# Patient Record
Sex: MALE | Race: WHITE | NOT HISPANIC OR LATINO | Employment: FULL TIME | URBAN - METROPOLITAN AREA
[De-identification: names, ages, dates, MRNs, and addresses within clinical notes are randomized per-mention and may not be internally consistent; named-entity substitution may affect disease eponyms.]

---

## 2017-04-25 ENCOUNTER — COMMUNICATION - HEALTHEAST (OUTPATIENT)
Dept: UROLOGY | Facility: CLINIC | Age: 32
End: 2017-04-25

## 2017-05-01 ENCOUNTER — COMMUNICATION - HEALTHEAST (OUTPATIENT)
Dept: FAMILY MEDICINE | Facility: CLINIC | Age: 32
End: 2017-05-01

## 2017-05-02 ENCOUNTER — OFFICE VISIT - HEALTHEAST (OUTPATIENT)
Dept: FAMILY MEDICINE | Facility: CLINIC | Age: 32
End: 2017-05-02

## 2017-05-02 DIAGNOSIS — Z01.818 PREOP EXAMINATION: ICD-10-CM

## 2017-05-02 DIAGNOSIS — N21.0 URINARY BLADDER STONE: ICD-10-CM

## 2017-05-02 ASSESSMENT — MIFFLIN-ST. JEOR: SCORE: 1638.21

## 2017-05-03 ENCOUNTER — ANESTHESIA - HEALTHEAST (OUTPATIENT)
Dept: SURGERY | Facility: HOSPITAL | Age: 32
End: 2017-05-03

## 2017-05-03 ENCOUNTER — SURGERY - HEALTHEAST (OUTPATIENT)
Dept: SURGERY | Facility: HOSPITAL | Age: 32
End: 2017-05-03

## 2017-05-09 LAB
1ST CONSTITUENT:: NORMAL
2ND CONSTITUENT:: NORMAL
SOURCE: NORMAL

## 2017-05-26 ENCOUNTER — OFFICE VISIT - HEALTHEAST (OUTPATIENT)
Dept: FAMILY MEDICINE | Facility: CLINIC | Age: 32
End: 2017-05-26

## 2017-05-26 DIAGNOSIS — E55.9 VITAMIN D DEFICIENCY: ICD-10-CM

## 2017-05-26 DIAGNOSIS — L72.0 EPIDERMAL INCLUSION CYST: ICD-10-CM

## 2017-05-26 DIAGNOSIS — Z00.00 ROUTINE GENERAL MEDICAL EXAMINATION AT A HEALTH CARE FACILITY: ICD-10-CM

## 2017-05-26 LAB
CHOLEST SERPL-MCNC: 127 MG/DL
FASTING STATUS PATIENT QL REPORTED: YES
HDLC SERPL-MCNC: 52 MG/DL
LDLC SERPL CALC-MCNC: 61 MG/DL
TRIGL SERPL-MCNC: 72 MG/DL

## 2017-05-26 ASSESSMENT — MIFFLIN-ST. JEOR: SCORE: 1631.18

## 2017-06-16 ENCOUNTER — RECORDS - HEALTHEAST (OUTPATIENT)
Dept: ADMINISTRATIVE | Facility: OTHER | Age: 32
End: 2017-06-16

## 2017-08-25 ENCOUNTER — COMMUNICATION - HEALTHEAST (OUTPATIENT)
Dept: FAMILY MEDICINE | Facility: CLINIC | Age: 32
End: 2017-08-25

## 2017-10-06 ENCOUNTER — AMBULATORY - HEALTHEAST (OUTPATIENT)
Dept: FAMILY MEDICINE | Facility: CLINIC | Age: 32
End: 2017-10-06

## 2017-10-06 DIAGNOSIS — L72.3 SEBACEOUS CYST: ICD-10-CM

## 2017-10-06 DIAGNOSIS — B07.9 WART: ICD-10-CM

## 2018-06-01 ENCOUNTER — COMMUNICATION - HEALTHEAST (OUTPATIENT)
Dept: SCHEDULING | Facility: CLINIC | Age: 33
End: 2018-06-01

## 2018-06-01 ENCOUNTER — OFFICE VISIT - HEALTHEAST (OUTPATIENT)
Dept: FAMILY MEDICINE | Facility: CLINIC | Age: 33
End: 2018-06-01

## 2018-06-01 DIAGNOSIS — E80.6 HYPERBILIRUBINEMIA: ICD-10-CM

## 2018-06-01 DIAGNOSIS — R03.0 ELEVATED BLOOD PRESSURE READING: ICD-10-CM

## 2018-06-01 DIAGNOSIS — Z00.00 ROUTINE GENERAL MEDICAL EXAMINATION AT A HEALTH CARE FACILITY: ICD-10-CM

## 2018-06-01 DIAGNOSIS — E55.9 VITAMIN D DEFICIENCY: ICD-10-CM

## 2018-06-01 DIAGNOSIS — Z13.220 SCREENING, LIPID: ICD-10-CM

## 2018-06-01 LAB
ALBUMIN SERPL-MCNC: 4.4 G/DL (ref 3.5–5)
ALP SERPL-CCNC: 79 U/L (ref 45–120)
ALT SERPL W P-5'-P-CCNC: 20 U/L (ref 0–45)
ANION GAP SERPL CALCULATED.3IONS-SCNC: 6 MMOL/L (ref 5–18)
APTT PPP: 30 SECONDS (ref 24–37)
AST SERPL W P-5'-P-CCNC: 24 U/L (ref 0–40)
BILIRUB DIRECT SERPL-MCNC: 0.3 MG/DL
BILIRUB SERPL-MCNC: 1 MG/DL (ref 0–1)
BUN SERPL-MCNC: 10 MG/DL (ref 8–22)
CALCIUM SERPL-MCNC: 9.8 MG/DL (ref 8.5–10.5)
CHLORIDE BLD-SCNC: 104 MMOL/L (ref 98–107)
CHOLEST SERPL-MCNC: 122 MG/DL
CO2 SERPL-SCNC: 30 MMOL/L (ref 22–31)
CREAT SERPL-MCNC: 0.96 MG/DL (ref 0.7–1.3)
ERYTHROCYTE [DISTWIDTH] IN BLOOD BY AUTOMATED COUNT: 11.4 % (ref 11–14.5)
FASTING STATUS PATIENT QL REPORTED: YES
GFR SERPL CREATININE-BSD FRML MDRD: >60 ML/MIN/1.73M2
GLUCOSE BLD-MCNC: 88 MG/DL (ref 70–125)
HCT VFR BLD AUTO: 44.6 % (ref 40–54)
HDLC SERPL-MCNC: 49 MG/DL
HGB BLD-MCNC: 15.4 G/DL (ref 14–18)
INR PPP: 1 (ref 0.9–1.1)
LDLC SERPL CALC-MCNC: 59 MG/DL
MCH RBC QN AUTO: 30.6 PG (ref 27–34)
MCHC RBC AUTO-ENTMCNC: 34.5 G/DL (ref 32–36)
MCV RBC AUTO: 89 FL (ref 80–100)
PLATELET # BLD AUTO: 269 THOU/UL (ref 140–440)
PMV BLD AUTO: 8.6 FL (ref 7–10)
POTASSIUM BLD-SCNC: 4.2 MMOL/L (ref 3.5–5)
PROT SERPL-MCNC: 7.3 G/DL (ref 6–8)
RBC # BLD AUTO: 5.03 MILL/UL (ref 4.4–6.2)
SODIUM SERPL-SCNC: 140 MMOL/L (ref 136–145)
TRIGL SERPL-MCNC: 70 MG/DL
WBC: 4.7 THOU/UL (ref 4–11)

## 2018-06-01 ASSESSMENT — MIFFLIN-ST. JEOR: SCORE: 1643.43

## 2018-06-04 LAB
25(OH)D3 SERPL-MCNC: 29.9 NG/ML (ref 30–80)
25(OH)D3 SERPL-MCNC: 29.9 NG/ML (ref 30–80)

## 2018-06-06 ENCOUNTER — AMBULATORY - HEALTHEAST (OUTPATIENT)
Dept: FAMILY MEDICINE | Facility: CLINIC | Age: 33
End: 2018-06-06

## 2018-10-04 ENCOUNTER — AMBULATORY - HEALTHEAST (OUTPATIENT)
Dept: NURSING | Facility: CLINIC | Age: 33
End: 2018-10-04

## 2019-04-29 ENCOUNTER — OFFICE VISIT - HEALTHEAST (OUTPATIENT)
Dept: FAMILY MEDICINE | Facility: CLINIC | Age: 34
End: 2019-04-29

## 2019-04-29 DIAGNOSIS — R07.9 ACUTE CHEST PAIN: ICD-10-CM

## 2019-04-29 DIAGNOSIS — I10 HTN (HYPERTENSION): ICD-10-CM

## 2019-04-29 LAB
ATRIAL RATE - MUSE: 64 BPM
DIASTOLIC BLOOD PRESSURE - MUSE: NORMAL MMHG
INTERPRETATION ECG - MUSE: NORMAL
P AXIS - MUSE: 60 DEGREES
PR INTERVAL - MUSE: 192 MS
QRS DURATION - MUSE: 98 MS
QT - MUSE: 404 MS
QTC - MUSE: 416 MS
R AXIS - MUSE: 61 DEGREES
SYSTOLIC BLOOD PRESSURE - MUSE: NORMAL MMHG
T AXIS - MUSE: 39 DEGREES
VENTRICULAR RATE- MUSE: 64 BPM

## 2019-04-30 LAB
ANION GAP SERPL CALCULATED.3IONS-SCNC: 11 MMOL/L (ref 5–18)
BUN SERPL-MCNC: 6 MG/DL (ref 8–22)
CALCIUM SERPL-MCNC: 10 MG/DL (ref 8.5–10.5)
CHLORIDE BLD-SCNC: 103 MMOL/L (ref 98–107)
CO2 SERPL-SCNC: 28 MMOL/L (ref 22–31)
CREAT SERPL-MCNC: 0.88 MG/DL (ref 0.7–1.3)
GFR SERPL CREATININE-BSD FRML MDRD: >60 ML/MIN/1.73M2
GLUCOSE BLD-MCNC: 88 MG/DL (ref 70–125)
POTASSIUM BLD-SCNC: 3.8 MMOL/L (ref 3.5–5)
SODIUM SERPL-SCNC: 142 MMOL/L (ref 136–145)

## 2019-05-14 ENCOUNTER — AMBULATORY - HEALTHEAST (OUTPATIENT)
Dept: NURSING | Facility: CLINIC | Age: 34
End: 2019-05-14

## 2019-05-14 DIAGNOSIS — Z01.30 BP CHECK: ICD-10-CM

## 2019-05-14 DIAGNOSIS — I10 HTN (HYPERTENSION): ICD-10-CM

## 2019-05-14 LAB
ANION GAP SERPL CALCULATED.3IONS-SCNC: 9 MMOL/L (ref 5–18)
BUN SERPL-MCNC: 8 MG/DL (ref 8–22)
CALCIUM SERPL-MCNC: 9.6 MG/DL (ref 8.5–10.5)
CHLORIDE BLD-SCNC: 104 MMOL/L (ref 98–107)
CO2 SERPL-SCNC: 26 MMOL/L (ref 22–31)
CREAT SERPL-MCNC: 0.94 MG/DL (ref 0.7–1.3)
GFR SERPL CREATININE-BSD FRML MDRD: >60 ML/MIN/1.73M2
GLUCOSE BLD-MCNC: 96 MG/DL (ref 70–125)
POTASSIUM BLD-SCNC: 3.9 MMOL/L (ref 3.5–5)
SODIUM SERPL-SCNC: 139 MMOL/L (ref 136–145)

## 2019-06-04 ENCOUNTER — OFFICE VISIT - HEALTHEAST (OUTPATIENT)
Dept: FAMILY MEDICINE | Facility: CLINIC | Age: 34
End: 2019-06-04

## 2019-06-04 DIAGNOSIS — E55.9 VITAMIN D DEFICIENCY: ICD-10-CM

## 2019-06-04 DIAGNOSIS — N20.0 CALCULUS OF KIDNEY: ICD-10-CM

## 2019-06-04 DIAGNOSIS — Z00.00 ROUTINE GENERAL MEDICAL EXAMINATION AT A HEALTH CARE FACILITY: ICD-10-CM

## 2019-06-04 DIAGNOSIS — I10 HTN (HYPERTENSION): ICD-10-CM

## 2019-06-04 ASSESSMENT — MIFFLIN-ST. JEOR: SCORE: 1646.83

## 2019-06-19 ENCOUNTER — COMMUNICATION - HEALTHEAST (OUTPATIENT)
Dept: SCHEDULING | Facility: CLINIC | Age: 34
End: 2019-06-19

## 2019-06-21 ENCOUNTER — OFFICE VISIT - HEALTHEAST (OUTPATIENT)
Dept: FAMILY MEDICINE | Facility: CLINIC | Age: 34
End: 2019-06-21

## 2019-06-21 ENCOUNTER — COMMUNICATION - HEALTHEAST (OUTPATIENT)
Dept: FAMILY MEDICINE | Facility: CLINIC | Age: 34
End: 2019-06-21

## 2019-06-21 DIAGNOSIS — R55 NEAR SYNCOPE: ICD-10-CM

## 2019-06-21 DIAGNOSIS — S06.0X9D CONCUSSION WITH LOSS OF CONSCIOUSNESS, SUBSEQUENT ENCOUNTER: ICD-10-CM

## 2019-06-21 DIAGNOSIS — I10 ESSENTIAL HYPERTENSION: ICD-10-CM

## 2019-06-21 DIAGNOSIS — I10 HTN (HYPERTENSION): ICD-10-CM

## 2019-06-21 RX ORDER — ACETAMINOPHEN 500 MG
500 TABLET ORAL EVERY 6 HOURS PRN
Status: SHIPPED | COMMUNITY
Start: 2019-06-21

## 2019-09-17 ENCOUNTER — AMBULATORY - HEALTHEAST (OUTPATIENT)
Dept: NURSING | Facility: CLINIC | Age: 34
End: 2019-09-17

## 2020-02-24 ENCOUNTER — OFFICE VISIT - HEALTHEAST (OUTPATIENT)
Dept: FAMILY MEDICINE | Facility: CLINIC | Age: 35
End: 2020-02-24

## 2020-02-24 DIAGNOSIS — I10 ESSENTIAL HYPERTENSION: ICD-10-CM

## 2020-02-24 DIAGNOSIS — F41.0 PANIC ATTACK: ICD-10-CM

## 2020-02-25 LAB
ANION GAP SERPL CALCULATED.3IONS-SCNC: 10 MMOL/L (ref 5–18)
BUN SERPL-MCNC: 6 MG/DL (ref 8–22)
CALCIUM SERPL-MCNC: 9.9 MG/DL (ref 8.5–10.5)
CHLORIDE BLD-SCNC: 102 MMOL/L (ref 98–107)
CO2 SERPL-SCNC: 29 MMOL/L (ref 22–31)
CREAT SERPL-MCNC: 0.95 MG/DL (ref 0.7–1.3)
GFR SERPL CREATININE-BSD FRML MDRD: >60 ML/MIN/1.73M2
GLUCOSE BLD-MCNC: 68 MG/DL (ref 70–125)
POTASSIUM BLD-SCNC: 3.8 MMOL/L (ref 3.5–5)
SODIUM SERPL-SCNC: 141 MMOL/L (ref 136–145)
T4 FREE SERPL-MCNC: 1 NG/DL (ref 0.7–1.8)
TSH SERPL DL<=0.005 MIU/L-ACNC: 0.38 UIU/ML (ref 0.3–5)

## 2020-03-02 ENCOUNTER — OFFICE VISIT - HEALTHEAST (OUTPATIENT)
Dept: FAMILY MEDICINE | Facility: CLINIC | Age: 35
End: 2020-03-02

## 2020-03-02 DIAGNOSIS — F41.0 PANIC ATTACK: ICD-10-CM

## 2020-03-02 DIAGNOSIS — M54.50 ACUTE MIDLINE LOW BACK PAIN WITHOUT SCIATICA: ICD-10-CM

## 2020-03-02 DIAGNOSIS — M62.08 DIASTASIS RECTI: ICD-10-CM

## 2020-03-02 ASSESSMENT — ANXIETY QUESTIONNAIRES
IF YOU CHECKED OFF ANY PROBLEMS ON THIS QUESTIONNAIRE, HOW DIFFICULT HAVE THESE PROBLEMS MADE IT FOR YOU TO DO YOUR WORK, TAKE CARE OF THINGS AT HOME, OR GET ALONG WITH OTHER PEOPLE: SOMEWHAT DIFFICULT
3. WORRYING TOO MUCH ABOUT DIFFERENT THINGS: SEVERAL DAYS
2. NOT BEING ABLE TO STOP OR CONTROL WORRYING: SEVERAL DAYS
5. BEING SO RESTLESS THAT IT IS HARD TO SIT STILL: NOT AT ALL
GAD7 TOTAL SCORE: 7
1. FEELING NERVOUS, ANXIOUS, OR ON EDGE: SEVERAL DAYS
6. BECOMING EASILY ANNOYED OR IRRITABLE: MORE THAN HALF THE DAYS
4. TROUBLE RELAXING: MORE THAN HALF THE DAYS
7. FEELING AFRAID AS IF SOMETHING AWFUL MIGHT HAPPEN: NOT AT ALL

## 2020-06-29 ENCOUNTER — COMMUNICATION - HEALTHEAST (OUTPATIENT)
Dept: FAMILY MEDICINE | Facility: CLINIC | Age: 35
End: 2020-06-29

## 2020-06-29 DIAGNOSIS — I10 HTN (HYPERTENSION): ICD-10-CM

## 2020-08-11 ENCOUNTER — OFFICE VISIT - HEALTHEAST (OUTPATIENT)
Dept: FAMILY MEDICINE | Facility: CLINIC | Age: 35
End: 2020-08-11

## 2020-08-11 DIAGNOSIS — Z00.00 ROUTINE GENERAL MEDICAL EXAMINATION AT A HEALTH CARE FACILITY: ICD-10-CM

## 2020-08-11 DIAGNOSIS — Z79.899 CONTROLLED SUBSTANCE AGREEMENT SIGNED: ICD-10-CM

## 2020-08-11 DIAGNOSIS — F90.9 ATTENTION DEFICIT HYPERACTIVITY DISORDER (ADHD), UNSPECIFIED ADHD TYPE: ICD-10-CM

## 2020-08-11 DIAGNOSIS — E55.9 VITAMIN D DEFICIENCY: ICD-10-CM

## 2020-08-11 DIAGNOSIS — I10 ESSENTIAL HYPERTENSION: ICD-10-CM

## 2020-08-11 DIAGNOSIS — Z13.220 LIPID SCREENING: ICD-10-CM

## 2020-08-11 LAB
ALBUMIN SERPL-MCNC: 4.7 G/DL (ref 3.5–5)
ALP SERPL-CCNC: 71 U/L (ref 45–120)
ALT SERPL W P-5'-P-CCNC: 18 U/L (ref 0–45)
ANION GAP SERPL CALCULATED.3IONS-SCNC: 11 MMOL/L (ref 5–18)
AST SERPL W P-5'-P-CCNC: 19 U/L (ref 0–40)
BILIRUB SERPL-MCNC: 1.3 MG/DL (ref 0–1)
BUN SERPL-MCNC: 8 MG/DL (ref 8–22)
CALCIUM SERPL-MCNC: 9.7 MG/DL (ref 8.5–10.5)
CHLORIDE BLD-SCNC: 104 MMOL/L (ref 98–107)
CHOLEST SERPL-MCNC: 124 MG/DL
CO2 SERPL-SCNC: 25 MMOL/L (ref 22–31)
CREAT SERPL-MCNC: 0.87 MG/DL (ref 0.7–1.3)
FASTING STATUS PATIENT QL REPORTED: YES
GFR SERPL CREATININE-BSD FRML MDRD: >60 ML/MIN/1.73M2
GLUCOSE BLD-MCNC: 91 MG/DL (ref 70–125)
HDLC SERPL-MCNC: 53 MG/DL
LDLC SERPL CALC-MCNC: 59 MG/DL
POTASSIUM BLD-SCNC: 3.8 MMOL/L (ref 3.5–5)
PROT SERPL-MCNC: 7.5 G/DL (ref 6–8)
SODIUM SERPL-SCNC: 140 MMOL/L (ref 136–145)
TRIGL SERPL-MCNC: 58 MG/DL

## 2020-08-11 RX ORDER — LISINOPRIL 10 MG/1
10 TABLET ORAL DAILY
Qty: 90 TABLET | Refills: 3 | Status: SHIPPED | OUTPATIENT
Start: 2020-08-11 | End: 2021-08-20

## 2020-08-11 ASSESSMENT — ANXIETY QUESTIONNAIRES
2. NOT BEING ABLE TO STOP OR CONTROL WORRYING: MORE THAN HALF THE DAYS
3. WORRYING TOO MUCH ABOUT DIFFERENT THINGS: NEARLY EVERY DAY
IF YOU CHECKED OFF ANY PROBLEMS ON THIS QUESTIONNAIRE, HOW DIFFICULT HAVE THESE PROBLEMS MADE IT FOR YOU TO DO YOUR WORK, TAKE CARE OF THINGS AT HOME, OR GET ALONG WITH OTHER PEOPLE: VERY DIFFICULT
4. TROUBLE RELAXING: NEARLY EVERY DAY
GAD7 TOTAL SCORE: 16
6. BECOMING EASILY ANNOYED OR IRRITABLE: MORE THAN HALF THE DAYS
5. BEING SO RESTLESS THAT IT IS HARD TO SIT STILL: SEVERAL DAYS
1. FEELING NERVOUS, ANXIOUS, OR ON EDGE: MORE THAN HALF THE DAYS
7. FEELING AFRAID AS IF SOMETHING AWFUL MIGHT HAPPEN: NEARLY EVERY DAY

## 2020-08-11 ASSESSMENT — MIFFLIN-ST. JEOR: SCORE: 1672.01

## 2020-08-11 ASSESSMENT — PATIENT HEALTH QUESTIONNAIRE - PHQ9: SUM OF ALL RESPONSES TO PHQ QUESTIONS 1-9: 8

## 2020-08-12 LAB
25(OH)D3 SERPL-MCNC: 41.8 NG/ML (ref 30–80)
25(OH)D3 SERPL-MCNC: 41.8 NG/ML (ref 30–80)

## 2020-09-10 ENCOUNTER — COMMUNICATION - HEALTHEAST (OUTPATIENT)
Dept: FAMILY MEDICINE | Facility: CLINIC | Age: 35
End: 2020-09-10

## 2020-09-10 DIAGNOSIS — F90.9 ATTENTION DEFICIT HYPERACTIVITY DISORDER (ADHD), UNSPECIFIED ADHD TYPE: ICD-10-CM

## 2020-10-05 ENCOUNTER — OFFICE VISIT - HEALTHEAST (OUTPATIENT)
Dept: FAMILY MEDICINE | Facility: CLINIC | Age: 35
End: 2020-10-05

## 2020-10-05 DIAGNOSIS — F90.9 ATTENTION DEFICIT HYPERACTIVITY DISORDER (ADHD), UNSPECIFIED ADHD TYPE: ICD-10-CM

## 2020-10-06 ENCOUNTER — AMBULATORY - HEALTHEAST (OUTPATIENT)
Dept: OTHER | Facility: CLINIC | Age: 35
End: 2020-10-06

## 2020-10-13 ENCOUNTER — COMMUNICATION - HEALTHEAST (OUTPATIENT)
Dept: FAMILY MEDICINE | Facility: CLINIC | Age: 35
End: 2020-10-13

## 2020-11-11 ENCOUNTER — COMMUNICATION - HEALTHEAST (OUTPATIENT)
Dept: FAMILY MEDICINE | Facility: CLINIC | Age: 35
End: 2020-11-11

## 2020-11-12 ENCOUNTER — AMBULATORY - HEALTHEAST (OUTPATIENT)
Dept: FAMILY MEDICINE | Facility: CLINIC | Age: 35
End: 2020-11-12

## 2020-11-12 DIAGNOSIS — F90.9 ATTENTION DEFICIT HYPERACTIVITY DISORDER (ADHD), UNSPECIFIED ADHD TYPE: ICD-10-CM

## 2020-12-11 ENCOUNTER — COMMUNICATION - HEALTHEAST (OUTPATIENT)
Dept: FAMILY MEDICINE | Facility: CLINIC | Age: 35
End: 2020-12-11

## 2020-12-11 DIAGNOSIS — F90.9 ATTENTION DEFICIT HYPERACTIVITY DISORDER (ADHD), UNSPECIFIED ADHD TYPE: ICD-10-CM

## 2021-01-12 ENCOUNTER — AMBULATORY - HEALTHEAST (OUTPATIENT)
Dept: FAMILY MEDICINE | Facility: CLINIC | Age: 36
End: 2021-01-12

## 2021-01-12 DIAGNOSIS — F90.9 ATTENTION DEFICIT HYPERACTIVITY DISORDER (ADHD), UNSPECIFIED ADHD TYPE: ICD-10-CM

## 2021-02-12 ENCOUNTER — COMMUNICATION - HEALTHEAST (OUTPATIENT)
Dept: FAMILY MEDICINE | Facility: CLINIC | Age: 36
End: 2021-02-12

## 2021-02-12 DIAGNOSIS — F90.9 ATTENTION DEFICIT HYPERACTIVITY DISORDER (ADHD), UNSPECIFIED ADHD TYPE: ICD-10-CM

## 2021-03-01 ENCOUNTER — RECORDS - HEALTHEAST (OUTPATIENT)
Dept: ADMINISTRATIVE | Facility: OTHER | Age: 36
End: 2021-03-01

## 2021-03-02 ENCOUNTER — COMMUNICATION - HEALTHEAST (OUTPATIENT)
Dept: FAMILY MEDICINE | Facility: CLINIC | Age: 36
End: 2021-03-02

## 2021-03-02 DIAGNOSIS — S92.404S CLOSED NONDISPLACED FRACTURE OF PHALANX OF RIGHT GREAT TOE, UNSPECIFIED PHALANX, SEQUELA: ICD-10-CM

## 2021-03-02 RX ORDER — IBUPROFEN 800 MG/1
800 TABLET, FILM COATED ORAL EVERY 8 HOURS PRN
Qty: 60 TABLET | Refills: 0 | Status: SHIPPED | OUTPATIENT
Start: 2021-03-02 | End: 2021-11-14

## 2021-03-08 ENCOUNTER — COMMUNICATION - HEALTHEAST (OUTPATIENT)
Dept: FAMILY MEDICINE | Facility: CLINIC | Age: 36
End: 2021-03-08

## 2021-03-08 ENCOUNTER — OFFICE VISIT - HEALTHEAST (OUTPATIENT)
Dept: FAMILY MEDICINE | Facility: CLINIC | Age: 36
End: 2021-03-08

## 2021-03-08 DIAGNOSIS — S92.404S CLOSED NONDISPLACED FRACTURE OF PHALANX OF RIGHT GREAT TOE, UNSPECIFIED PHALANX, SEQUELA: ICD-10-CM

## 2021-03-08 DIAGNOSIS — F43.10 PTSD (POST-TRAUMATIC STRESS DISORDER): ICD-10-CM

## 2021-03-08 DIAGNOSIS — F90.9 ATTENTION DEFICIT HYPERACTIVITY DISORDER (ADHD), UNSPECIFIED ADHD TYPE: ICD-10-CM

## 2021-03-16 ENCOUNTER — COMMUNICATION - HEALTHEAST (OUTPATIENT)
Dept: FAMILY MEDICINE | Facility: CLINIC | Age: 36
End: 2021-03-16

## 2021-03-22 ENCOUNTER — RECORDS - HEALTHEAST (OUTPATIENT)
Dept: ADMINISTRATIVE | Facility: OTHER | Age: 36
End: 2021-03-22

## 2021-03-29 ENCOUNTER — OFFICE VISIT - HEALTHEAST (OUTPATIENT)
Dept: FAMILY MEDICINE | Facility: CLINIC | Age: 36
End: 2021-03-29

## 2021-03-29 DIAGNOSIS — F43.10 PTSD (POST-TRAUMATIC STRESS DISORDER): ICD-10-CM

## 2021-03-29 ASSESSMENT — ANXIETY QUESTIONNAIRES
6. BECOMING EASILY ANNOYED OR IRRITABLE: SEVERAL DAYS
3. WORRYING TOO MUCH ABOUT DIFFERENT THINGS: MORE THAN HALF THE DAYS
GAD7 TOTAL SCORE: 10
7. FEELING AFRAID AS IF SOMETHING AWFUL MIGHT HAPPEN: MORE THAN HALF THE DAYS
4. TROUBLE RELAXING: MORE THAN HALF THE DAYS
IF YOU CHECKED OFF ANY PROBLEMS ON THIS QUESTIONNAIRE, HOW DIFFICULT HAVE THESE PROBLEMS MADE IT FOR YOU TO DO YOUR WORK, TAKE CARE OF THINGS AT HOME, OR GET ALONG WITH OTHER PEOPLE: SOMEWHAT DIFFICULT
2. NOT BEING ABLE TO STOP OR CONTROL WORRYING: SEVERAL DAYS
1. FEELING NERVOUS, ANXIOUS, OR ON EDGE: SEVERAL DAYS
5. BEING SO RESTLESS THAT IT IS HARD TO SIT STILL: SEVERAL DAYS

## 2021-03-29 ASSESSMENT — PATIENT HEALTH QUESTIONNAIRE - PHQ9: SUM OF ALL RESPONSES TO PHQ QUESTIONS 1-9: 4

## 2021-04-12 ENCOUNTER — RECORDS - HEALTHEAST (OUTPATIENT)
Dept: ADMINISTRATIVE | Facility: OTHER | Age: 36
End: 2021-04-12

## 2021-04-13 ENCOUNTER — COMMUNICATION - HEALTHEAST (OUTPATIENT)
Dept: FAMILY MEDICINE | Facility: CLINIC | Age: 36
End: 2021-04-13

## 2021-04-13 DIAGNOSIS — F90.9 ATTENTION DEFICIT HYPERACTIVITY DISORDER (ADHD), UNSPECIFIED ADHD TYPE: ICD-10-CM

## 2021-04-19 ENCOUNTER — OFFICE VISIT - HEALTHEAST (OUTPATIENT)
Dept: FAMILY MEDICINE | Facility: CLINIC | Age: 36
End: 2021-04-19

## 2021-04-19 DIAGNOSIS — F90.9 ATTENTION DEFICIT HYPERACTIVITY DISORDER (ADHD), UNSPECIFIED ADHD TYPE: ICD-10-CM

## 2021-04-19 DIAGNOSIS — F43.10 PTSD (POST-TRAUMATIC STRESS DISORDER): ICD-10-CM

## 2021-05-19 ENCOUNTER — RECORDS - HEALTHEAST (OUTPATIENT)
Dept: ADMINISTRATIVE | Facility: OTHER | Age: 36
End: 2021-05-19

## 2021-05-19 DIAGNOSIS — F90.9 ATTENTION DEFICIT HYPERACTIVITY DISORDER (ADHD), UNSPECIFIED ADHD TYPE: ICD-10-CM

## 2021-05-19 RX ORDER — DEXTROAMPHETAMINE SACCHARATE, AMPHETAMINE ASPARTATE MONOHYDRATE, DEXTROAMPHETAMINE SULFATE AND AMPHETAMINE SULFATE 7.5; 7.5; 7.5; 7.5 MG/1; MG/1; MG/1; MG/1
30 CAPSULE, EXTENDED RELEASE ORAL DAILY
Qty: 30 CAPSULE | Refills: 0 | Status: SHIPPED | OUTPATIENT
Start: 2021-05-19 | End: 2021-07-21

## 2021-05-24 ENCOUNTER — OFFICE VISIT - HEALTHEAST (OUTPATIENT)
Dept: FAMILY MEDICINE | Facility: CLINIC | Age: 36
End: 2021-05-24

## 2021-05-24 DIAGNOSIS — R10.9 CHRONIC ABDOMINAL PAIN: ICD-10-CM

## 2021-05-24 DIAGNOSIS — F43.10 PTSD (POST-TRAUMATIC STRESS DISORDER): ICD-10-CM

## 2021-05-24 DIAGNOSIS — M25.511 ACUTE PAIN OF RIGHT SHOULDER: ICD-10-CM

## 2021-05-24 DIAGNOSIS — G89.29 CHRONIC ABDOMINAL PAIN: ICD-10-CM

## 2021-05-24 RX ORDER — VENLAFAXINE HYDROCHLORIDE 150 MG/1
150 CAPSULE, EXTENDED RELEASE ORAL DAILY
Qty: 30 CAPSULE | Refills: 2 | Status: SHIPPED
Start: 2021-05-24 | End: 2021-07-22

## 2021-05-24 ASSESSMENT — ANXIETY QUESTIONNAIRES
6. BECOMING EASILY ANNOYED OR IRRITABLE: SEVERAL DAYS
5. BEING SO RESTLESS THAT IT IS HARD TO SIT STILL: NOT AT ALL
GAD7 TOTAL SCORE: 7
7. FEELING AFRAID AS IF SOMETHING AWFUL MIGHT HAPPEN: SEVERAL DAYS
1. FEELING NERVOUS, ANXIOUS, OR ON EDGE: SEVERAL DAYS
2. NOT BEING ABLE TO STOP OR CONTROL WORRYING: SEVERAL DAYS
3. WORRYING TOO MUCH ABOUT DIFFERENT THINGS: SEVERAL DAYS
IF YOU CHECKED OFF ANY PROBLEMS ON THIS QUESTIONNAIRE, HOW DIFFICULT HAVE THESE PROBLEMS MADE IT FOR YOU TO DO YOUR WORK, TAKE CARE OF THINGS AT HOME, OR GET ALONG WITH OTHER PEOPLE: SOMEWHAT DIFFICULT
4. TROUBLE RELAXING: MORE THAN HALF THE DAYS

## 2021-05-24 ASSESSMENT — PATIENT HEALTH QUESTIONNAIRE - PHQ9: SUM OF ALL RESPONSES TO PHQ QUESTIONS 1-9: 7

## 2021-05-24 ASSESSMENT — MIFFLIN-ST. JEOR: SCORE: 1644.23

## 2021-05-26 ASSESSMENT — PATIENT HEALTH QUESTIONNAIRE - PHQ9: SUM OF ALL RESPONSES TO PHQ QUESTIONS 1-9: 8

## 2021-05-27 ASSESSMENT — PATIENT HEALTH QUESTIONNAIRE - PHQ9: SUM OF ALL RESPONSES TO PHQ QUESTIONS 1-9: 4

## 2021-05-28 ASSESSMENT — ANXIETY QUESTIONNAIRES
GAD7 TOTAL SCORE: 16
GAD7 TOTAL SCORE: 10
GAD7 TOTAL SCORE: 7

## 2021-05-28 NOTE — PROGRESS NOTES
ASSESSMENT/PLAN:       1. HTN (hypertension)  -Blood pressure remains elevated, elevated this morning as well.  He now has several elevated blood pressures on record, with this and his strong family history of high blood pressure he is agreeable to starting a low-dose of lisinopril.  He will update his basic metabolic panel today and again in 2 weeks.  He will follow-up here in approximately 1 month for a physical and we can check things at that time.  In the meantime he will watch for any signs of orthostatic hypotension including dizziness upon standing.  If he starts noting that he will let me know.  -We discussed that his headaches are most likely related to his high blood pressure, and if they are not improving he will let me know.  Additionally the chest pain is fairly intermittent and not related to activity but if it persists or starts worsening he will let me know in which case I will order stress test.  He is very low risk however given his age.  - Electrocardiogram Perform and Read  - Basic Metabolic Panel  - lisinopril (PRINIVIL,ZESTRIL) 10 MG tablet; Take 1 tablet (10 mg total) by mouth daily.  Dispense: 30 tablet; Refill: 1    2. Acute chest pain  -Intermittent in nature, not typically associated with movement, will continue to monitor for now.  - Electrocardiogram Perform and Read    Starting medication, will f/u with nurses in a few weeks for BP check and updated labs. Will see me in about one month for a physical.     Return for Next scheduled follow up.      Lilly Catalan MD      PROGRESS NOTE   4/29/2019    SUBJECTIVE:  Aurelio Ames is a 33 y.o. male  who presents for high blood presure.     BP was elevated at physical last year, and here this morning with son again. Comes in for a visit. Has had some intermittnet chest pressure at times, not always related to exercise, can happen when just sitting down. He has had headaches as well, they have been going on at least one month. He  has not been more short of breath than usual.     Does drink energy drinks, 1/day, does vape, roughly 60 mg per 3 weeks. Is stressed all the time.   Chief Complaint   Patient presents with     Hypertension     Patient is here today to follow up on his blood pressure. He has been having an increase in headaches and has pain on the left side of his chest while at work or when keeping busy at home.          Patient Active Problem List   Diagnosis     Microscopic Hematuria     Nephrolithiasis     Vitamin D Deficiency     Elevated blood pressure reading     History of colostomy       Current Outpatient Medications   Medication Sig Dispense Refill     CRANBERRY FRUIT CONCENTRATE (AZO CRANBERRY ORAL) Take by mouth daily.       docosahexanoic acid/epa (FISH OIL ORAL) Take by mouth daily.       ergocalciferol, vitamin D2, (VITAMIN D2 ORAL) Take by mouth 2 (two) times a day.       MAGNESIUM ORAL Take by mouth daily.       MULTIVIT-MINERALS/FA/LYCOPENE (MEN'S DAILY ORAL) Take by mouth.       lisinopril (PRINIVIL,ZESTRIL) 10 MG tablet Take 1 tablet (10 mg total) by mouth daily. 30 tablet 1     No current facility-administered medications for this visit.        Social History     Tobacco Use   Smoking Status Former Smoker     Packs/day: 1.50   Smokeless Tobacco Current User   Tobacco Comment    Uses vape pen           OBJECTIVE:        Recent Results (from the past 240 hour(s))   Electrocardiogram Perform and Read   Result Value Ref Range    SYSTOLIC BLOOD PRESSURE  mmHg    DIASTOLIC BLOOD PRESSURE  mmHg    VENTRICULAR RATE 64 BPM    ATRIAL RATE 64 BPM    P-R INTERVAL 192 ms    QRS DURATION 98 ms    Q-T INTERVAL 404 ms    QTC CALCULATION (BEZET) 416 ms    P Axis 60 degrees    R AXIS 61 degrees    T AXIS 39 degrees    MUSE DIAGNOSIS       Normal sinus rhythm  Minimal voltage criteria for LVH, may be normal variant  Borderline ECG  No previous ECGs available  Confirmed by LISETH DIMAS MD LOC:[facility] ([id number]) on 4/29/2019 3:56:55 PM          Vitals:    04/29/19 1534 04/29/19 1602   BP: (!) 142/100 (!) 144/100   Patient Site: Left Arm    Patient Position: Sitting    Cuff Size: Adult Regular    Pulse: 70    SpO2: 98%    Weight: 164 lb (74.4 kg)      Weight: 164 lb (74.4 kg)          Physical Exam:  GENERAL APPEARANCE: A&A, NAD, well hydrated, well nourished  SKIN:  Normal skin turgor, no lesions/rashes   HEENT: moist mucous membranes, no rhinorrhea  NECK: Normal  CV: RRR, no M/G/R   LUNGS: CTAB  ABDOMEN: S&NT, no masses   EXTREMITY: no edema   NEURO: no gross deficits

## 2021-05-28 NOTE — PROGRESS NOTES
I met with Aurelio Ames at the request of Dr. Catalan to recheck his blood pressure.  Blood pressure medications on the MAR were reviewed with patient.    Patient has taken all medications as per usual regimen: Yes  Patient reports tolerating them without any issues or concerns: Yes    Vitals:    05/14/19 0858   BP: 108/82       Blood pressure was taken, previous encounter was reviewed, recorded blood pressure below 140/90.  Patient was discharged and the note will be sent to the provider for final review.

## 2021-05-28 NOTE — PATIENT INSTRUCTIONS - HE
Starting medication, will f/u with nurses in a few weeks for BP check and updated labs. Will see me in about one month for a physical.

## 2021-05-29 ENCOUNTER — RECORDS - HEALTHEAST (OUTPATIENT)
Dept: ADMINISTRATIVE | Facility: CLINIC | Age: 36
End: 2021-05-29
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
## 2021-05-29 NOTE — PROGRESS NOTES
ASSESSMENT/PLAN:       1. Concussion with loss of consciousness, subsequent encounter  Okay for the patient to return to work tomorrow with our restrictions  Stay well-hydrated  Try to limit screen time and do not do strenuous physical activity for 2 or 3 more days    2. Essential hypertension  Continue on lisinopril 10 mg daily  Blood pressure seems to be well controlled and no other side effects    3. Near syncope  No further symptoms and would anticipate that he will do fine going back to work tomorrow.        25 minutes spent total with the patient with greater than 50% of that time being spent in counseling in regard to the above problems    Igor Holloway MD      PROGRESS NOTE   6/21/2019    SUBJECTIVE:  Aurelio Ames is a 33 y.o. male  who presents for   Chief Complaint   Patient presents with     Follow-up     [facility] ER 6/19, hit head. Yesterday felt dizzy, nausous and had headache. Today he feels back to normal.     1. Concussion with loss of consciousness, subsequent encounter  In June 18 the patient was at the playground with his child and child 3 years old and he was concerned that he is going to jump off the playground equipment the patient was up on the equipment raised up quickly and hit the top of his head on the playground equipment.  He saw stars and briefly lost consciousness.  He felt fine after that although then the next day he had some nausea headache and while he was at work he felt very flushed sweaty and as he was standing he felt like he was going to pass out.  His vision narrowed and he was able to sit down and call for help.  He also had some associated shortness of breath and chest pain and thus went to the emergency room by ambulance. he does not recall that he was told his blood pressure was low in the readings I see from the urgency room visit looked satisfactory.  He had testing done in the emergency room that included a CT scan of the head which was negative and his other  blood work looked fine.  He was given some IV fluids and dismissed..  The next day he still felt foggy in his thinking with headache and he stayed home from work and rested.  This morning when he woke up he feels much better with no further headache is thinking seems clear he does not have any nausea.    2. Essential hypertension  Note that the patient in the last month was started on lisinopril and in the emergency room his creatinine and electrolytes were satisfactory.  He is continued on lisinopril 10 mg daily    3. Near syncope  The patient has not had any further syncopal symptoms and has been drinking good amounts of fluid.  Of note the CT scan did show some fluid in his left maxillary sinus but he has no symptoms of sinusitis  Patient Active Problem List   Diagnosis     Microscopic Hematuria     Nephrolithiasis     Vitamin D Deficiency     Elevated blood pressure reading     History of colostomy     HTN (hypertension)       Current Outpatient Medications   Medication Sig Dispense Refill     acetaminophen (TYLENOL) 500 MG tablet Take 500 mg by mouth every 6 (six) hours as needed for pain.       CRANBERRY FRUIT CONCENTRATE (AZO CRANBERRY ORAL) Take by mouth daily.       docosahexanoic acid/epa (FISH OIL ORAL) Take by mouth daily.       ergocalciferol, vitamin D2, (VITAMIN D2 ORAL) Take by mouth 2 (two) times a day.       lisinopril (PRINIVIL,ZESTRIL) 10 MG tablet Take 1 tablet (10 mg total) by mouth daily. 90 tablet 3     MAGNESIUM ORAL Take by mouth daily.       MULTIVIT-MINERALS/FA/LYCOPENE (MEN'S DAILY ORAL) Take by mouth.       No current facility-administered medications for this visit.        Social History     Tobacco Use   Smoking Status Former Smoker     Packs/day: 1.50   Smokeless Tobacco Current User   Tobacco Comment    Uses vape pen           OBJECTIVE:        Recent Results (from the past 240 hour(s))   HM2(CBC w/o Differential)   Result Value Ref Range    WBC 5.3 4.0 - 11.0 thou/uL    RBC 4.91  4.40 - 6.20 mill/uL    Hemoglobin 14.9 14.0 - 18.0 g/dL    Hematocrit 40.9 40.0 - 54.0 %    MCV 83 80 - 100 fL    MCH 30.3 27.0 - 34.0 pg    MCHC 36.4 (H) 32.0 - 36.0 g/dL    RDW 12.0 11.0 - 14.5 %    Platelets 284 140 - 440 thou/uL    MPV 9.8 8.5 - 12.5 fL   Basic Metabolic Panel   Result Value Ref Range    Sodium 138 136 - 145 mmol/L    Potassium 3.7 3.5 - 5.0 mmol/L    Chloride 104 98 - 107 mmol/L    CO2 25 22 - 31 mmol/L    Anion Gap, Calculation 9 5 - 18 mmol/L    Glucose 108 70 - 125 mg/dL    Calcium 9.7 8.5 - 10.5 mg/dL    BUN 10 8 - 22 mg/dL    Creatinine 0.96 0.70 - 1.30 mg/dL    GFR MDRD Af Amer >60 >60 mL/min/1.73m2    GFR MDRD Non Af Amer >60 >60 mL/min/1.73m2       Vitals:    06/21/19 1048   BP: 105/56   Pulse: 80   SpO2: 97%   Weight: 155 lb (70.3 kg)     Weight: 155 lb (70.3 kg)          Physical Exam:  GENERAL APPEARANCE: 33-year-old male, NAD, well hydrated, well nourished  SKIN:  Normal skin turgor, no lesions/rashes   HEENT: moist mucous membranes, no rhinorrhea  NECK: Normal without adenopathy or masses, supple with good range of motion.  Head is tender over the top of his head where he bumped it and there is a small raised area there but no break in the skin  CV: RRR, no M/G/R   LUNGS: CTAB  ABDOMEN: S&NT, no masses or enlarged organs   EXTREMITY: no edema and full ROM of all joints  NEURO: no focal findings, no focal deficits and his gait is normal including balance and negative Romberg

## 2021-05-29 NOTE — TELEPHONE ENCOUNTER
Attempted to call and schedule pt but VM is full.  If pt calls back please help in setting up an appt for evaluation

## 2021-05-29 NOTE — TELEPHONE ENCOUNTER
New Appointment Needed  What is the reason for the visit: pt has mild concussion [facility] ED     Inpatient/ED Follow Up Appt Request  At what hospital or facility were you seen?: [facility]  What is the reason you were seen?:  Mild concussion  What date were you admitted?: date: [id number]  What date were you discharged?: date: tyra Catalan  What was the recommended timeframe for your follow up appointment?: 061919  Provider Preference: by friday [id number]  How soon do you need to be seen?: By Friday [id number]  Waitlist offered?: No  Okay to leave a detailed message:  Yes

## 2021-05-29 NOTE — TELEPHONE ENCOUNTER
Refill Approved    Rx renewed per Medication Renewal Policy. Medication was last renewed on 6/4/2019 with 3 refills, #90 each..   Noted request for #30 to dispense.   Last office visit: 6/4/2019 with PCP Dr CULLEN Matthew, [facility] Triage/Med Refill 6/23/2019     Requested Prescriptions   Pending Prescriptions Disp Refills     lisinopril (PRINIVIL,ZESTRIL) 10 MG tablet [Pharmacy Med Name: LISINOPRIL 10MG TABLETS] 30 tablet 0     Sig: TAKE 1 TABLET(10 MG) BY MOUTH DAILY       [facility] Inhib[facility] Refill Protocol Passed - 6/21/2019  3:11 AM        Passed - PCP or prescribing provider visit in past 12 months       Last office visit with prescriber/PCP: 4/29/2019 Lilly Catalan MD OR same dept: 4/29/2019 Lilly Catalan MD OR same specialty: 4/29/2019 Lilly Catalan MD  Last physical: 6/4/2019 Last MTM visit: Visit date not found   Next visit within 3 mo: Visit date not found  Next physical within 3 mo: Visit date not found  Prescriber OR PCP: Lilly Catalan MD  Last diagnosis associated with med order: 1. HTN (hypertension)  - lisinopril (PRINIVIL,ZESTRIL) 10 MG tablet [Pharmacy Med Name: LISINOPRIL 10MG TABLETS]; TAKE 1 TABLET(10 MG) BY MOUTH DAILY  Dispense: 30 tablet; Refill: 0    If protocol passes may refill for 12 months if within 3 months of last provider visit (or a total of 15 months).             Passed - Serum Potassium in past 12 months     Lab Results   Component Value Date    Potassium 3.7 06/19/2019             Passed - Blood pressure filed in past 12 months     BP Readings from Last 1 Encounters:   06/21/19 105/56             Passed - Serum Creatinine in past 12 months     Creatinine   Date Value Ref Range Status   06/19/2019 0.96 0.70 - 1.30 mg/dL Final

## 2021-05-30 ENCOUNTER — RECORDS - HEALTHEAST (OUTPATIENT)
Dept: ADMINISTRATIVE | Facility: CLINIC | Age: 36
End: 2021-05-30

## 2021-05-30 VITALS — HEIGHT: 71 IN | BODY MASS INDEX: 21.11 KG/M2 | WEIGHT: 150.8 LBS

## 2021-05-31 VITALS — BODY MASS INDEX: 21.62 KG/M2 | WEIGHT: 151 LBS | HEIGHT: 70 IN

## 2021-05-31 VITALS — BODY MASS INDEX: 22.47 KG/M2 | WEIGHT: 156.6 LBS

## 2021-06-01 VITALS — BODY MASS INDEX: 22 KG/M2 | WEIGHT: 153.7 LBS | HEIGHT: 70 IN

## 2021-06-03 VITALS — WEIGHT: 155 LBS | BODY MASS INDEX: 22.56 KG/M2

## 2021-06-03 VITALS — BODY MASS INDEX: 23.53 KG/M2 | WEIGHT: 164 LBS

## 2021-06-03 VITALS — BODY MASS INDEX: 22.36 KG/M2 | HEIGHT: 70 IN | WEIGHT: 156.2 LBS

## 2021-06-04 VITALS
BODY MASS INDEX: 24.29 KG/M2 | OXYGEN SATURATION: 99 % | SYSTOLIC BLOOD PRESSURE: 124 MMHG | DIASTOLIC BLOOD PRESSURE: 76 MMHG | WEIGHT: 166.9 LBS | HEART RATE: 60 BPM

## 2021-06-04 VITALS
BODY MASS INDEX: 23.73 KG/M2 | WEIGHT: 163 LBS | HEART RATE: 70 BPM | SYSTOLIC BLOOD PRESSURE: 126 MMHG | OXYGEN SATURATION: 99 % | DIASTOLIC BLOOD PRESSURE: 80 MMHG

## 2021-06-04 VITALS
HEIGHT: 71 IN | OXYGEN SATURATION: 99 % | HEART RATE: 61 BPM | BODY MASS INDEX: 21.91 KG/M2 | DIASTOLIC BLOOD PRESSURE: 78 MMHG | SYSTOLIC BLOOD PRESSURE: 128 MMHG | WEIGHT: 156.5 LBS

## 2021-06-05 ENCOUNTER — RECORDS - HEALTHEAST (OUTPATIENT)
Dept: FAMILY MEDICINE | Facility: CLINIC | Age: 36
End: 2021-06-05

## 2021-06-05 VITALS
HEART RATE: 72 BPM | BODY MASS INDEX: 21.56 KG/M2 | OXYGEN SATURATION: 98 % | DIASTOLIC BLOOD PRESSURE: 70 MMHG | SYSTOLIC BLOOD PRESSURE: 118 MMHG | WEIGHT: 154.6 LBS

## 2021-06-05 VITALS
SYSTOLIC BLOOD PRESSURE: 110 MMHG | DIASTOLIC BLOOD PRESSURE: 80 MMHG | OXYGEN SATURATION: 98 % | WEIGHT: 157.1 LBS | HEART RATE: 82 BPM | BODY MASS INDEX: 21.91 KG/M2

## 2021-06-05 VITALS
DIASTOLIC BLOOD PRESSURE: 64 MMHG | WEIGHT: 153 LBS | BODY MASS INDEX: 21.34 KG/M2 | OXYGEN SATURATION: 97 % | SYSTOLIC BLOOD PRESSURE: 110 MMHG | HEART RATE: 86 BPM
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
## 2021-06-09 NOTE — TELEPHONE ENCOUNTER
Refill Approved    Rx renewed per Medication Renewal Policy. Medication was last renewed on 6/4/19.    Rafia Torres, [facility] Triage/Med Refill 6/30/2020     Requested Prescriptions   Pending Prescriptions Disp Refills     lisinopriL (PRINIVIL,ZESTRIL) 10 MG tablet [Pharmacy Med Name: LISINOPRIL 10MG TABLETS] 90 tablet 3     Sig: TAKE 1 TABLET(10 MG) BY MOUTH DAILY       [facility]ce Inhibitors Refill Protocol Passed - 6/29/2020 10:05 AM        Passed - PCP or prescribing provider visit in past 12 months       Last office visit with prescriber/PCP: 3/2/2020 Lilly Catalan MD OR same dept: 3/2/2020 Lilly Catalan MD OR same specialty: 3/2/2020 Lilly Catalan MD  Last physical: 6/4/2019 Last MTM visit: Visit date not found   Next visit within 3 mo: Visit date not found  Next physical within 3 mo: Visit date not found  Prescriber OR PCP: Lilly Catalan MD  Last diagnosis associated with med order: 1. HTN (hypertension)  - lisinopriL (PRINIVIL,ZESTRIL) 10 MG tablet [Pharmacy Med Name: LISINOPRIL 10MG TABLETS]; TAKE 1 TABLET(10 MG) BY MOUTH DAILY  Dispense: 90 tablet; Refill: 3    If protocol passes may refill for 12 months if within 3 months of last provider visit (or a total of 15 months).             Passed - Serum Potassium in past 12 months     Lab Results   Component Value Date    Potassium 3.4 (L) 03/01/2020             Passed - Blood pressure filed in past 12 months     BP Readings from Last 1 Encounters:   03/02/20 124/76             Passed - Serum Creatinine in past 12 months     Creatinine   Date Value Ref Range Status   03/01/2020 0.98 0.70 - 1.30 mg/dL Final
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
## 2021-06-10 NOTE — ANESTHESIA POSTPROCEDURE EVALUATION
Patient: Aurelio Ames  CYSTOSCOPY, REMOVAL OF BLADDER STONE AND LITHOLAPAXY  Anesthesia type: general    Patient location: PACU  Last vitals:   Vitals:    05/03/17 1620   BP: 125/85   Pulse: (!) 59   Resp: 18   Temp:    SpO2: 99%     Post vital signs: stable  Level of consciousness: awake and responds to simple questions  Post-anesthesia pain: pain controlled  Post-anesthesia nausea and vomiting: no  Pulmonary: unassisted, return to baseline  Cardiovascular: stable and blood pressure at baseline  Hydration: adequate  Anesthetic events: no    QCDR Measures:  ASA# 11 - Chaya-op Cardiac Arrest: ASA11B - Patient did NOT experience unanticipated cardiac arrest  ASA# 12 - Chaya-op Mortality Rate: ASA12B - Patient did NOT die  ASA# 13 - PACU Re-Intubation Rate: ASA13B - Patient did NOT require a new airway mgmt  ASA# 10 - Composite Anes Safety: ASA10A - No serious adverse event  ASA# 38 - New Corneal Injury: ASA38A - No new exposure keratitis or corneal abrasion in PACU    Additional Notes:
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
## 2021-06-10 NOTE — ANESTHESIA CARE TRANSFER NOTE
Last vitals:   Vitals:    05/03/17 1558   BP: 127/69   Pulse: 67   Resp: 12   Temp: 36.5  C (97.7  F)   SpO2: 100%     Patient's level of consciousness is drowsy  Spontaneous respirations: yes  Maintains airway independently: yes  Dentition unchanged: yes  Oropharynx: oropharynx clear of all foreign objects    QCDR Measures:  ASA# 20 - Surgical Safety Checklist: ASA20A - Safety Checks Done  PQRS# 430 - Adult PONV Prevention: 4558F - Pt received => 2 anti-emetic agents (different classes) preop & intraop  ASA# 8 - Peds PONV Prevention: NA - Not pediatric patient, not GA or 2 or more risk factors NOT present  PQRS# 424 - Chaya-op Temp Management: 4559F - At least one body temp DOCUMENTED => 35.5C or 95.9F within required timeframe  PQRS# 426 - PACU Transfer Protocol: - Transfer of care checklist used  ASA# 14 - Acute Post-op Pain: ASA14B - Patient did NOT experience pain >= 7 out of 10    I completed my SBAR handoff to the receiving nurse per policy and procedure.

## 2021-06-12 NOTE — TELEPHONE ENCOUNTER
Medication Question or Clarification  Who is calling: patient   What medication are you calling about (include dose and sig)?: dextroamphetamine-amphetamine (ADDERALL XR) 20 MG 24 hr capsule  Who prescribed the medication?: Lilly Catalan MD    What is your question/concern?: patient was told to contact his provider , pharmacy does not have the prescription. Patient would like the prescription resent   Requested [facility]: [facility]  Okay to leave a detailed message?: Yes

## 2021-06-12 NOTE — PROGRESS NOTES
"Aurelio Ames is a 34 y.o. male who is being evaluated via a billable telephone visit.      The patient has been notified of following:     \"This telephone visit will be conducted via a call between you and your physician/provider. We have found that certain health care needs can be provided without the need for a physical exam.  This service lets us provide the care you need with a short phone conversation.  If a prescription is necessary we can send it directly to your pharmacy.  If lab work is needed we can place an order for that and you can then stop by our lab to have the test done at a later time.    Telephone visits are billed at different rates depending on your insurance coverage. During this emergency period, for some insurers they may be billed the same as an in-person visit.  Please reach out to your insurance provider with any questions.    If during the course of the call the physician/provider feels a telephone visit is not appropriate, you will not be charged for this service.\"    Patient has given verbal consent to a Telephone visit? Yes    What phone number would you like to be contacted at? [phone]    Patient would like to receive their AVS by AVS Preference: Luda.    Additional provider notes:      SUBJECTIVE:  Aurelio Ames is a 34 y.o. male  who presents for medication check.     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 He feels that things are going well. He is much more focused and is not as scatter brained as well. He is still anxious at times, but it is a bit better. Prior to starting the medication he finds that he is not as stuck in his head anymore. He is sleeping well, eating well. Doesn't seem to have lost weight.       Chief Complaint   Patient presents with     ADHD     Four week follow up since increasing to 20 mg of Adderall daily. Patient is doing well on this dose, feels focused.          Patient Active Problem List   Diagnosis     " Microscopic Hematuria     Nephrolithiasis     Vitamin D Deficiency     History of colostomy     HTN (hypertension)     Panic attack     Controlled substance agreement signed       Current Outpatient Medications   Medication Sig Dispense Refill     acetaminophen (TYLENOL) 500 MG tablet Take 500 mg by mouth every 6 (six) hours as needed for pain.       [START ON 10/8/2020] dextroamphetamine-amphetamine (ADDERALL XR) 20 MG 24 hr capsule Take 1 capsule (20 mg total) by mouth daily. 30 capsule 0     ergocalciferol, vitamin D2, (VITAMIN D2 ORAL) Take by mouth 2 (two) times a day.       lisinopriL (PRINIVIL,ZESTRIL) 10 MG tablet Take 1 tablet (10 mg total) by mouth daily. 90 tablet 3     MULTIVIT-MINERALS/FA/LYCOPENE (MEN'S DAILY ORAL) Take by mouth.       No current facility-administered medications for this visit.        Social History     Tobacco Use   Smoking Status Former Smoker     Packs/day: 1.50   Smokeless Tobacco Current User   Tobacco Comment    Uses vape pen           OBJECTIVE:        No results found for this or any previous visit (from the past 240 hour(s)).    There were no vitals filed for this visit.  No data recorded      Assessment/Plan:  1. Attention deficit hyperactivity disorder (ADHD), unspecified ADHD type  -Doing well on his current dose of medication. Will continue on this dose and follow up in six months.   - dextroamphetamine-amphetamine (ADDERALL XR) 20 MG 24 hr capsule; Take 1 capsule (20 mg total) by mouth daily.  Dispense: 30 capsule; Refill: 0        Phone call duration:  8 minutes    Lilly Catalan MD

## 2021-06-13 NOTE — TELEPHONE ENCOUNTER
Controlled Substance Refill Request  Medication Name:   Requested Prescriptions     Pending Prescriptions Disp Refills     dextroamphetamine-amphetamine (ADDERALL XR) 20 MG 24 hr capsule 30 capsule 0     Sig: Take 1 capsule (20 mg total) by mouth daily.     Date Last Fill: 11/12/20  Requested Pharmacy: [facility]  Submit electronically to pharmacy  Controlled Substance Agreement on file:   Encounter-Level CSA Scan Date:    There are no encounter-level csa scan date.        Last office visit:  10/5/20  CSA 8/18/20   no future appt scheduled

## 2021-06-13 NOTE — PROGRESS NOTES
"Skin / nail biopsy  Date/Time: 10/6/2017 10:00 AM  Performed by: ADRIEN DAVIS  Authorized by: ADRIEN DAVIS   Consent: Verbal consent obtained. Written consent obtained.  Risks and benefits: risks, benefits and alternatives were discussed  Consent given by: patient  Patient understanding: patient states understanding of the procedure being performed  Patient consent: the patient's understanding of the procedure matches consent given  Procedure consent: procedure consent matches procedure scheduled  Relevant documents: relevant documents present and verified  Site marked: the operative site was marked  Required items: required blood products, implants, devices, and special equipment available  Patient identity confirmed: verbally with patient  Time out: Immediately prior to procedure a \"time out\" was called to verify the correct patient, procedure, equipment, support staff and site/side marked as required.  Preparation: Patient was prepped and draped in the usual sterile fashion.  Local anesthesia used: yes    Anesthesia:  Local anesthesia used: yes    Sedation:  Patient sedated: no  Patient tolerance: Patient tolerated the procedure well with no immediate complications        Sebaceous Cyst Excision Procedure Note    Pre-operative Diagnosis: Sebaceous Cyst    Post-operative Diagnosis: same    Locations:upper back    Indications: Discomfort    Anesthesia: Lidocaine 1% with epinephrine with added sodium bicarbonate    Procedure Details   History of allergy to iodine: no    Patient informed of the risks (including bleeding and infection) and benefits of the   procedure and Written informed consent obtained.    The lesion and surrounding area was given a sterile prep using betadyne and draped in the usual sterile fashion. An incision was made over the cyst, which was dissected free of the surrounding tissue and removed.  The cyst was filled with typical sebaceous material.  The wound was " closed with 4-0 Nylon using simple interrupted stitches. Antibiotic ointment and a sterile dressing applied.  The specimen was not sent for pathologic examination. The patient tolerated the procedure well.    EBL: 3 ml    Findings:  Typical for sebaceous cyst. The capsule did tristan    Condition:  Stable    Complications:  none.    Plan:  1. Instructed to keep the wound dry and covered for 24-48h and clean thereafter.  2. Warning signs of infection were reviewed.    3. Recommended that the patient use OTC analgesics as needed for pain.   4. Return for suture removal in 10 days.

## 2021-06-13 NOTE — PROGRESS NOTES
"Cryotherapy, skin lesion  Date/Time: 10/6/2017 10:15 AM  Performed by: ADRIEN DAVIS  Authorized by: ADRIEN DAVIS   Consent: Verbal consent obtained.  Risks and benefits: risks, benefits and alternatives were discussed  Consent given by: patient  Patient understanding: patient states understanding of the procedure being performed  Patient consent: the patient's understanding of the procedure matches consent given  Procedure consent: procedure consent matches procedure scheduled  Relevant documents: relevant documents present and verified  Site marked: the operative site was marked  Required items: required blood products, implants, devices, and special equipment available  Patient identity confirmed: verbally with patient  Time out: Immediately prior to procedure a \"time out\" was called to verify the correct patient, procedure, equipment, support staff and site/side marked as required.  Preparation: Patient was prepped and draped in the usual sterile fashion.  Local anesthesia used: no    Anesthesia:  Local anesthesia used: no    Sedation:  Patient sedated: no  Patient tolerance: Patient tolerated the procedure well with no immediate complications        Assessment:      multiple wart.      Plan:      1. Patient education regarding warts was given. Frozen today without difficulty  2. Verbal patient instruction given.  3. Follow up as needed       Subjective:       Aurelio Ames is a 32 y.o. male who was referred to me for evaluation and treatment of warts. The wart has been present for about several months and is located on the left leg. Previous treatment has included nothing.   The following portions of the patient's history were reviewed and updated as appropriate: allergies, current medications, past family history, past medical history, past social history, past surgical history and problem list.    Review of Systems  Pertinent items are noted in HPI.      Objective:      Physical " Exam  Skin: 4  verrucous papule, papules on  left leg, 1 medially and 3 laterally on the upper shin         Procedure note:    Consent: Risks and benefits of therapy discussed with patient who voices understanding and agrees with planned care. No barriers to communication or understanding identified.  After obtaining informed consent, the patient's identity, procedure, and site were verified during a pause prior to proceeding with the minor surgical procedure as per universal protocol recommendations.  Warts were treated with cryocautery with freeze thaw freeze technique with 2-3 mm surround freeze. Local care discussed. Side effects of treatment and precautions discussed. All questions answered. To follow up if worse or any new problems

## 2021-06-15 NOTE — TELEPHONE ENCOUNTER
Patient in clinic with his daughter.     He does have a great toe fracture in two spots. He is in a lot of pain, did refuse pain medication at the ER and then did not get any medication at the specialist. He was seen at [facility].
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
## 2021-06-16 PROBLEM — Z79.899 CONTROLLED SUBSTANCE AGREEMENT SIGNED: Status: ACTIVE | Noted: 2020-08-13

## 2021-06-16 PROBLEM — F41.0 PANIC ATTACK: Status: ACTIVE | Noted: 2020-02-24

## 2021-06-16 PROBLEM — I10 HTN (HYPERTENSION): Status: ACTIVE | Noted: 2019-06-04

## 2021-06-16 NOTE — TELEPHONE ENCOUNTER
Central PA team  [phone]  Pool: HE PA MED ([id number])          PA has been initiated.       PA form completed and faxed insurance via Cover My Meds     Irene:  JOHN ENGLISH (Irene: [id number])      Medication:  Adderall XR 20 mg     Insurance:  HLP of [employer]        Response will be received via fax and may take up to 5-10 business days depending on plan

## 2021-06-16 NOTE — PROGRESS NOTES
Assessment & Plan     PTSD (post-traumatic stress disorder)  -Doing well at this time with improvement but still persistent symptoms.  Will increase venlafaxine to 75 mg orally daily as listed below and he will follow-up here in approximately 4 to 6 weeks for recheck.  He will call sooner if there are issues.  If he still having issues with mood stability at that point we could consider adding on a small dose of a mood stabilizer such as Lamictal.  - venlafaxine (EFFEXOR XR) 75 MG 24 hr capsule  Dispense: 30 capsule; Refill: 2    Attention deficit hyperactivity disorder (ADHD), unspecified ADHD type  -Continued issues with concentration although improved as well.  Increased dose of Adderall to 30 mg orally daily and again follow-up in 1 month.  - dextroamphetamine-amphetamine (ADDERALL XR) 30 MG 24 hr capsule  Dispense: 30 capsule; Refill: 0           Return in about 4 weeks (around 5/17/2021) for Next scheduled follow up.    Lilly Catalan MD  [facility]   Aurelio Ames is 35 y.o. and presents today for the following health issues   HPI     He is still having some outbursts and is still getting spacy as well. There has been a great improvement since starting the medicine. His wife does agree as well.  He does feel a change, it does take a lot more before he loses control. She does think that he needs a little more medication.     His wife is wondering about a mood stabilizer as well. He does have a lot of ups and downs as well.       Review of Systems  Per above      Objective    /64   Pulse 86   Wt 153 lb (69.4 kg)   SpO2 97%   BMI 21.34 kg/m    Body mass index is 21.34 kg/m .  Physical Exam  GENERAL: Healthy, alert and no distress  EYES: Eyes grossly normal to inspection. No discharge or erythema, or obvious scleral/conjunctival abnormalities.  RESP: No audible wheeze, cough, or visible cyanosis.  No visible retractions or increased work of  breathing.    NEURO: Cranial nerves grossly intact. Mentation and speech appropriate for age.  PSYCH: Mentation appears normal, affect normal/bright, judgement and insight intact, normal speech and appearance well-groomed

## 2021-06-16 NOTE — PROGRESS NOTES
Assessment & Plan     PTSD (post-traumatic stress disorder)  -Improving symptom control with the venlafaxine.  No obvious side effects at this time.  Plan on following up in approximately 3 weeks for his next recheck, he will call or message sooner if things are worsening and we can increase his dose.           Return in about 3 weeks (around 4/19/2021) for recheck.    Lilly Catalan MD  [facility]    Megan Ames is 35 y.o. and presents today for the following health issues   HPI   He does feel like he is doing a bit better. He has not punched anything or kicked anything since starting the medication. He does not feel as angry as he was. He does not feel as down either. He does still have moments where he is more angry but is controlling it better.     He does find that he is able to more control the things that needs to and not worry about everything that he can't control.     He has not been yelling as much, he has been talking more with his wife as well. He does think that things are working.     HALLIE-7 went from 16-10, PHQ-9 went from 8-4      Review of Systems  With the exception of the aforementioned issues, 12 point, comprehensive ROS was done and was negative.       Objective    /70 (Patient Site: Right Arm, Patient Position: Sitting, Cuff Size: Adult Regular)   Pulse 72   Wt 154 lb 9.6 oz (70.1 kg)   SpO2 98%   BMI 21.56 kg/m    Body mass index is 21.56 kg/m .  Physical Exam   GENERAL: Healthy, alert and no distress  EYES: Eyes grossly normal to inspection. No discharge or erythema, or obvious scleral/conjunctival abnormalities.  RESP: No audible wheeze, cough, or visible cyanosis.  No visible retractions or increased work of breathing.    NEURO: Cranial nerves grossly intact. Mentation and speech appropriate for age.  PSYCH: Mentation appears normal, affect normal/bright, judgement and insight intact, normal speech and appearance  well-groomed

## 2021-06-16 NOTE — TELEPHONE ENCOUNTER
Prior Authorization Request  Who s requesting:  Pharmacy   Pharmacy Name and Location:[facility]  Medication Name: Adderall XR 20mg  Insurance Plan: [place] health care programs  Insurance Member ID Number:  [id number]  CoverMyMeds Key:   Informed patient that prior authorizations can take up to 10 business days for response:     Okay to leave a detailed message:

## 2021-06-16 NOTE — TELEPHONE ENCOUNTER
Medication: Adderall XR 20mg  Last Date Filled 3/8/21  Last appointment addressing medication use: 3/8/21      Taken as prescribed from physician notes? YES    CSA in last year: YES    Random Utox in last year: NO  (if any of the above answer NO - schedule with PCP)     Opioids + benzodiazepines? NO  (if the above answer YES - schedule with PCP every 6 months)       All responses suggest: Scheduling with PCP for further intervention

## 2021-06-17 NOTE — TELEPHONE ENCOUNTER
Medication: Adderall XR 30mg  Last Date Filled 4/19/21  Last appointment addressing medication use: 4/19/21      Taken as prescribed from physician notes? YES    CSA in last year: YES    Random Utox in last year: NO  (if any of the above answer NO - schedule with PCP)     Opioids + benzodiazepines? NO  (if the above answer YES - schedule with PCP every 6 months)       All responses suggest: Scheduling with PCP for further intervention
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
## 2021-06-17 NOTE — TELEPHONE ENCOUNTER
Telephone Encounter by Shannon Lynch at 3/16/2021  2:18 PM     Author: Shannon Lynch Service: -- Author Type: Patient Access    Filed: 3/16/2021  2:35 PM Encounter Date: 3/16/2021 Status: Signed    : Shannon Lynch (Patient Access)       Prior Authorization Not Needed     Insurance details: Per the P Rep, Brand is covered and Preferred. The last paid claim would be on 02/12/21 for the Brand medication and the reason why the pharmacy is getting a Prior Authorization Needed is because they are submitted for the generic medication.      Pharmacy details: [id number] [place] [address] AT [facility] [address] [phone] I called and spoke with the pharmacy and they will go ahead and switch to back to the Brand as he had that last month.

## 2021-06-18 ENCOUNTER — COMMUNICATION - HEALTHEAST (OUTPATIENT)
Dept: FAMILY MEDICINE | Facility: CLINIC | Age: 36
End: 2021-06-18

## 2021-06-18 DIAGNOSIS — F90.9 ATTENTION DEFICIT HYPERACTIVITY DISORDER (ADHD), UNSPECIFIED ADHD TYPE: ICD-10-CM

## 2021-06-18 RX ORDER — DEXTROAMPHETAMINE SACCHARATE, AMPHETAMINE ASPARTATE MONOHYDRATE, DEXTROAMPHETAMINE SULFATE AND AMPHETAMINE SULFATE 7.5; 7.5; 7.5; 7.5 MG/1; MG/1; MG/1; MG/1
30 CAPSULE, EXTENDED RELEASE ORAL DAILY
Qty: 30 CAPSULE | Refills: 0 | Status: SHIPPED | OUTPATIENT
Start: 2021-06-18 | End: 2021-07-21

## 2021-06-18 NOTE — PROGRESS NOTES
Assessment:     1. Routine general medical examination at a health care facility    2. Elevated blood pressure reading    3. Hyperbilirubinemia    4. Vitamin D deficiency    5. Screening, lipid        Plan:      1. Routine general medical examination at a health care facility  -Routine health maintenance discussion:  No smoking, limited alcohol (7 or less servings per week), 5 fruits/veg servings per day, 200 minutes of exercise per week.  Daily calcium/vitamin D guidelines, bone health, colon cancer screening beginning at age 50.  Accident avoidance, sun screen.     2. Elevated blood pressure reading  -Elevated today, will decrease caffeine intake and follow up in a few weeks for a BP check    3. Hyperbilirubinemia  -Updating labs today, now normal, discussed possibility of something Gilbert's syndrome. Will monitor for now  - Comprehensive Metabolic Panel  - HM2(CBC w/o Differential)  - INR  - APTT(PTT)  - Bilirubin, Direct    4. Vitamin D deficiency  -updating today, continue with supplementation  - Vitamin D, Total (25-Hydroxy)    5. Screening, lipid  - Lipid Cascade       Subjective:      Aurelio Ames is a 32 y.o. male who presents for an annual exam. The patient reports that there is not domestic violence in his life.     He is generally doing ok. No big questions. He did have a high bilirubin last year and a low vitamin D that he was hoping to have updated today.     Healthy Habits:   Regular Exercise: Yes, while at work (heavy lifting and a lot of walking). He is working for a delivery service now.   Sunscreen Use: Yes  Healthy Diet: No  Dental Visits Regularly: Yes  Seat Belt: Sometimes (mostly at work).   Sexually active: Yes  Monthly Self Testicular Exams:  No  Hemoccults: N/A  Flex Sig: N/A  Colonoscopy: N/A  Lipid Profile: Yes  Glucose Screen: Yes  Prevention of Osteoporosis: No  Last Dexa: N/A  Guns at Home:  N/A      Immunization History   Administered Date(s) Administered     Tdap 02/24/2016      Immunization status: up to date and documented.    No exam data present     Current Outpatient Prescriptions   Medication Sig Dispense Refill     CRANBERRY FRUIT CONCENTRATE (AZO CRANBERRY ORAL) Take by mouth daily.       MULTIVIT-MINERALS/FA/LYCOPENE (MEN'S DAILY ORAL) Take by mouth.       No current facility-administered medications for this visit.      Past Medical History:   Diagnosis Date     History of transfusion      Past Surgical History:   Procedure Laterality Date     CYSTOSCOPY W/ LITHOLAPAXY / EHL N/A 5/3/2017    Procedure: CYSTOSCOPY, REMOVAL OF BLADDER STONE AND LITHOLAPAXY;  Surgeon: Humberto White MD;  Location: [facility] [place];  Service:      FRACTURE SURGERY      left fractured ankle when 18 years old     GASTRECTOMY  2011     IN LAP,URETEROLITHOTOMY      Description: Ureterolithotomy Laparoscopic;  Recorded: 05/23/2013;     Hydromorphone  Family History   Problem Relation Age of Onset     Drug abuse Mother      Methamphetamine     Stroke Mother      Diabetes Mother      Hypertension Father      Diabetes Father      Diabetes type I Brother 3     No Medical Problems Son      Arthritis Paternal Grandmother      Social History     Social History     Marital status:      Spouse name: N/A     Number of children: N/A     Years of education: N/A     Occupational History     Not on file.     Social History Main Topics     Smoking status: Former Smoker     Packs/day: 1.50     Smokeless tobacco: Current User      Comment: Uses vape pen     Alcohol use Yes      Comment: Quit 6 years ago. Might have a beer once in a while.      Drug use: No     Sexual activity: Yes     Partners: Female     Birth control/ protection: None     Other Topics Concern     Not on file     Social History Narrative    Works as a . Lives at home with his wife Alicia and son Aurelio OSMAN       Review of Systems  Review of Systems   With the exception of the aforementioned issues, 12 point,  "comprehensive ROS was done and was negative.     He does have some positives due to his previous injuries, please see scanned form for full details      Objective:     Vitals:    06/01/18 0825 06/01/18 0843   BP: (!) 130/100 (!) 128/100   Pulse: (!) 56    Temp: 98.2  F (36.8  C)    TempSrc: Oral    SpO2: 99%    Weight: 153 lb 11.2 oz (69.7 kg)    Height: 5' 10\" (1.778 m)      Body mass index is 22.05 kg/(m^2).    Physical  Physical Exam  Well developed, well nourished, no acute distress.  HEENT: normocephalic/atraumatic, PERRLA/EOMI, TMs: Gray, normal light reflex, no nasal discharge.  Oral mucosa: no erythema/exudate  Neck: No LAD/masses/thyromegaly  Lungs: clear bilaterally  Heart: regular rate and rhythm, no murmurs/gallops/rubs  Abdomen: Normal bowel sounds, soft, mildly tender (due to previous gastrectomy), non-distended, no masses, neg Pimentel's/McBurney's, no rebound/guarding  Genital: deferred, no complaints  Lymphatics: no supraclavicular/axillary/epitrochlear/inguinal LAD. No edema.  Neuro: A&O x 3, CN II-XII intact, strength 5/5, reflexes symmetric, sensory intact to light touch.  Psych: Behavior appropriate, engaging.  Thought processes congruent, non-tangential.  Musculoskeletal: no gross deformities.  Skin: no rashes or lesions.         "

## 2021-06-19 NOTE — LETTER
Letter by Igor Holloway MD at      Author: Igor Holloway MD Service: -- Author Type: --    Filed:  Encounter Date: 6/21/2019 Status: (Other)         June 21, 2019     Patient: Aurelio Ames   YOB: 1985   Date of Visit: 6/21/2019       To Whom it May Concern:    Aurelio Ames was seen in my clinic on 6/21/2019.  Please excuse patient from work 6/20 and 6/21/19 because of medical problem.  OK to return to work on 6/22/19 without restrictions.    If you have any questions or concerns, please don't hesitate to call.    Sincerely,         Electronically signed by Igor Holloway MD

## 2021-06-20 NOTE — LETTER
Letter by Lilly Catalan MD at      Author: Lilly Catalan MD Service: -- Author Type: --    Filed:  Encounter Date: 8/11/2020 Status: (Other)         [facility] FAMILY MEDICINE/OB  08/11/20    Patient: Aurelio Ames  YOB: 1985  Medical Record Number: [id number]  CSN: [id number]                                                                              Non-opioid Controlled Substance Agreement    I understand that my care provider has prescribed a controlled substance to help manage my condition(s). I am taking this medicine to help me function or work. I know this is strong medicine, and that it can cause serious side effects. Controlled substances can be sedating, addicting and may cause a dependency on the drug. They can affect my ability to drive or think, and cause depression. They need to be taken exactly as prescribed. Combining controlled substances with certain medicines or chemicals (such as cocaine, sedatives and tranquilizers, sleeping pills, meth) can be dangerous or even fatal. Also, if I stop controlled substances suddenly, I may have severe withdrawal symptoms.  If not helpful, I may be asked to stop them.    The risks, benefits, and side effects of these medicine(s) were explained to me. I agree that:    1. I will take part in other treatments as advised by my care team. This may be psychiatry or counseling, physical therapy, behavioral therapy, group treatment or a referral to a pain clinic. I will reduce or stop my medicine when my care team tells me to do so.  2. I will take my medicines as prescribed. I will not change the dose or schedule unless my care team tells me to. There will be no refills if I run out early.  I may be contactedwithout warning and asked to complete a urine drug test or pill count at any time.   3. I will keep all my appointments, and understand this is part of the monitoring of controlled substances. My care team may  require an office visit for EVERY controlled substance refill. If I miss appointments or dont follow instructions, my care team may stop my medicine.  4. I will not ask other providers to prescribe controlled substances, and I will not accept controlled substances from other people. If I need another prescribed controlled substance for a new reason, I will tell my care team within 1 business day.  5. I will use one pharmacy to fill all of my controlled substance prescriptions, and it is up to me to make sure that I do not run out of my medicines on weekends or holidays. If my care team is willing to refill my controlled substance prescription without a visit, I must request refills only during office hours, refills may take up to 3 days to process, and it may take up to 5 to 7 days for my medicine to be mailed and ready at my pharmacy. Prescriptions will not be mailed anywhere except my pharmacy.    6. I am responsible for my prescriptions. If the medicine/prescription is lost or stolen, it will not be replaced. I also agree not to share controlled substance medicines with anyone.          [facility] FAMILY MEDICINE/OB  08/11/20  Patient:  Aurelio Ames  YOB: 1985  Medical Record Number: [id number]  CSN: [id number]    7. I agree to not use ANY illegal or recreational drugs. This includes marijuana, cocaine, bath salts or other drugs. I agree not to use alcohol unless my care team says I may. I agree to give urine samples whenever asked. If I dont give a urine sample, the care team may stop my medicine.    8. If I enroll in the [place] Medical Marijuana program, I will tell my care team. I will also sign an agreement to share my medical records with my care team.    9. I will bring in my list of medicines (or my medicine bottles) each time I come to the clinic.   10. I will tell my care team right away if I become pregnant or have a new medical problem treated outside of my regular  clinic.  11. I understand that this medicine can affect my thinking and judgment. It may be unsafe for me to drive, use machinery and do dangerous tasks. I will not do any of these things until I know how the medicine affects me. If my dose changes, I will wait to see how it affects me. I will contact my care team if I have concerns about medicine side effects.    I understand that if I do not follow any of the conditions above, my prescriptions or treatment may be stopped.      I agree that my provider, clinic care team, and pharmacy may work with any city, state or federal law enforcement agency that investigates the misuse, sale, or other diversion of my controlled medicine. I will allow my provider to discuss my care with or share a copy of this agreement with any other treating provider, pharmacy or emergency room where I receive care. I agree to give up (waive) any right of privacy or confidentiality with respect to these consents.   I have read this agreement and have asked questions about anything I did not understand.    ___________________________________________________________________________  Patient signature - Date/Time  -Aurelio Ames                                      ___________________________________________________________________________  Witness signature                                                                    ___________________________________________________________________________  Provider signature- Lilly Catalan MD

## 2021-06-20 NOTE — LETTER
Letter by Lilly Catalan MD at      Author: Lilly Catalan MD Service: -- Author Type: --    Filed:  Encounter Date: 3/2/2020 Status: (Other)         March 2, 2020     Patient: Aurelio Ames   YOB: 1985   Date of Visit: 3/2/2020       To Whom it May Concern:    Aurelio Ames was seen in my clinic on 3/2/2020.    He was seen in the ER on 3/1/2020, and followed up here in clinic for this. He should be excused from 3/1/2020-3/4/2020, ok to return to work on 3/5/2020.       If you have any questions or concerns, please don't hesitate to call.    Sincerely,         Electronically signed by Lilly Catalan MD

## 2021-06-21 NOTE — LETTER
Letter by Lilly Catalan MD at      Author: Lilly Catalan MD Service: -- Author Type: --    Filed:  Encounter Date: 5/24/2021 Status: (Other)       Non-Opioid Controlled Substance Agreement    This is an agreement between you and your provider regarding safe and appropriate controlled substance prescribing.? Controlled substances are?medicines that can cause physical and mental dependence. The manufacturing, possession and use of these medicines are regulated by law.  We here at [facility] are making a commitment to work with you in your efforts to get better.? To support you in this work, we will help you schedule regular appointments for medicine refills. If we must cancel or change your appointment for any reason, we will make sure you have enough medication to last until your next appointment.      As a Provider, I will:     Listen carefully to your concerns while treating you with dignity.     Recommend a treatment plan that I believe is in your best interest and may involve therapies other than medication.      Review the chance of benefit and the chance of harm of this medicine with you regularly and evaluate the safety and effectiveness of this therapy.       Provide a plan on how to discontinue if the decision is made to stop this medicine.       As a Patient, I understand controlled substances:       Are prescribed by my care provider to help me function or work and manage my condition(s).?    Are strong medicines and can cause serious side effects.      Need to be taken exactly as prescribed.?Combining controlled substances with certain medicines or chemicals (such as illegal drugs, alcohol, sedatives, sleeping pills, and benzodiazepines) can be dangerous or even fatal.? If I stop taking my medicines suddenly, I may have severe withdrawal symptoms.     The risks, benefits, and side effects of these medicine(s) were explained to me. I agree that:    1. I will take part in  other treatments as advised by my care team. This may be psychiatry or counseling, physical therapy, behavioral therapy, group treatment or a referral to specialist.    2. I will keep all my appointments and understand this is part of the monitoring of controlled substance.?My care team may require an office visit for EVERY controlled substance refill. If I miss appointments or dont follow instructions, my care team may stop my medicine    3. I will take my medicines as prescribed. I will not change the dose or schedule unless my care team tells me to. There will be no refills if I run out early.      4. I may be contactedwithout warning and asked to complete a urine drug test or pill count at any time. If I dont give a urine sample or participate in a pill count, the care team may stop my medicine.    5. I will only receive controlled substance prescriptions from this clinic. If treated by another provider, I will let them know I am taking controlled substances and that I have a treatment agreement with this provider. I will inform this care team within one business day if I am given a prescription for any controlled substance by another healthcare provider. This care team may contact other providers and pharmacists about my use of the medicines.     6. It is up to me to make sure that I do not run out of my medicines on weekends or holidays.?If my care team is willing to refill my prescription without a visit, I must request refills only during office hours. Refills may take up to 3 business days to process.  I will use one pharmacy to fill all my controlled substance prescriptions.  I will notify the clinic if any changes are made due to insurance changes or medication availability.    7. I am responsible for my prescriptions. If the medicine/prescription is lost, stolen or destroyed, it will not be replaced.?I also agree not to share controlled substance medicines with anyone.     8. I am aware I should not use  any illegal or recreational drugs. I agree not to drink alcohol unless my care team says I can.     9. If I enroll in the [place] Medical Cannabis program, I will tell my care team.?    10. I will tell my care team right away if I become pregnant, have a new medical problem treated outside of my regular clinic, or have a change in my medications.     11. I understand that this medicine can affect my thinking, judgment and reaction time.? Alcohol and drugs affect the brain and body, which can affect the safety of a persons driving. Being under the influence of alcohol or drugs can affect a persons decision-making, behaviors, personal safety, and the safety of others. Driving while impaired (DWI) can occur if a person is driving, operating, or in physical control of a car, motorcycle, boat, snowmobile, ATV, motorbike, off-road vehicle, or any other motor vehicle ([place] Statute 169[place].20). I understand the risk if I choose to drive or operate machinery  I understand that if I do not follow any of the conditions above, my prescriptions or treatment may be stopped or changed.     I agree that my provider, clinic care team, and pharmacy may work with any city, state or federal law enforcement agency that investigates the misuse, sale, or other diversion of my controlled medicine. I will allow my provider to discuss my care with or share a copy of this agreement with any other treating provider, pharmacy or emergency room where I receive care.?    I have read this agreement and have asked questions about anything I did not understand.    ________________________________________________________  Patient Signature - Aurelio Ames     ___________________                   Date     ________________________________________________________  Provider Signature - Lilly Catalan MD       ___________________                   Date     ________________________________________________________  Witness Signature (required  if provider not present while patient signing)          ___________________                   Date

## 2021-06-21 NOTE — LETTER
Letter by Lilly Catalan MD at      Author: Lilly Catalan MD Service: -- Author Type: --    Filed:  Encounter Date: 3/8/2021 Status: (Other)         [facility]  03/08/21    Patient: Aurelio Ames  YOB: 1985  Medical Record Number: [id number]                                                                  Opioid / Opioid Plus Controlled Substance Agreement    I understand that my care provider has prescribed an opioid (narcotic) controlled substance to help manage my condition(s). I am taking this medicine to help me function or work. I know this is strong medicine, and that it can cause serious side effects. Opioid medicine can be sedating, addicting and may cause a dependency on the drug. They can affect my ability to drive or think, and cause depression. They need to be taken exactly as prescribed. Combining opioids with certain medicines or chemicals (such as cocaine, sedatives and tranquilizers, sleeping pills, meth) can be dangerous or even fatal. Also, if I stop opioids suddenly, I may have severe withdrawal symptoms. Last, I understand that opioids do not work for all types of pain nor for all patients. If not helpful, I may be asked to stop them.        The risks, benefits, and side effects of these medicine(s) were explained to me. I agree that:    1. I will take part in other treatments as advised by my care team. This may be psychiatry or counseling, physical therapy, behavioral therapy, group treatment or a referral to a pain clinic. I will reduce or stop my medicine when my care team tells me to do so.  2. I will take my medicines as prescribed. I will not change the dose or schedule unless my care team tells me to. There will be no refills if I run out early.  I may be contactedwithout warning and asked to complete a urine drug test or pill count at any time.   3. I will keep all my appointments, and understand this is part of the  monitoring of opioids. My care team may require an office visit for EVERY opioid/controlled substance refill. If I miss appointments or dont follow instructions, my care team may stop my medicine.  4. I will not ask other providers to prescribe controlled substances, and I will not accept controlled substances from other people. If I need another prescribed controlled substance for a new reason, I will tell my care team within 1 business day.  5. I will use one pharmacy to fill all of my controlled substance prescriptions, and it is up to me to make sure that I do not run out of my medicines on weekends or holidays. If my care team is willing to refill my opioid prescription without a visit, I must request refills only during office hours, refills may take up to 3 days to process, and it may take up to 5 to 7 days for my medicine to be mailed and ready at my pharmacy. Prescriptions will not be mailed anywhere except my pharmacy.        [id number]  Rev 12/18         Registration to scan to EHR                             Page 1 of 2               Controlled Substance Agreement [facility]  03/08/21  Patient: Aurelio Ames  YOB: 1985  Medical Record Number: [id number]                                                                  6. I am responsible for my prescriptions. If the medicine/prescription is lost or stolen, it will not be replaced. I also agree not to share controlled substance medicines with anyone.  7. I agree to not use ANY illegal or recreational drugs. This includes marijuana, cocaine, bath salts or other drugs. I agree not to use alcohol unless my care team says I may.          I agree to give urine samples whenever asked. If I dont give a urine sample, the care team may stop my medicine.    8. If I enroll in the [place] Medical Marijuana program, I will tell my care team. I will also sign an agreement to share my medical records with my care  team.   9. I will bring in my list of medicines (or my medicine bottles) each time I come to the clinic.   10. I will tell my care team right away if I become pregnant or have a new medical problem treated outside of my regular clinic.  11. I understand that this medicine can affect my thinking and judgment. It may be unsafe for me to drive, use machinery and do dangerous tasks. I will not do any of these things until I know how the medicine affects me. If my dose changes, I will wait to see how it affects me. I will contact my care team if I have concerns about medicine side effects.    I understand that if I do not follow any of the conditions above, my prescriptions or treatment may be stopped.      I agree that my provider, clinic care team, and pharmacy may work with any city, state or federal law enforcement agency that investigates the misuse, sale, or other diversion of my controlled medicine. I will allow my provider to discuss my care with or share a copy of this agreement with any other treating provider, pharmacy or emergency room where I receive care. I agree to give up (waive) any right of privacy or confidentiality with respect to these consents.     I have read this agreement and have asked questions about anything I did not understand.      ________________________________________________________________________  Patient signature - Date/Time -  Aurelio Ames                                      ________________________________________________________________________  Witness signature                                                            ________________________________________________________________________  Provider signature - Lilly Catalan MD      [id number]  Rev 12/18         Registration to scan to EHR                         Page 2 of 2                   Controlled Substance Agreement Opioid           Page 1 of 2  Opioid Pain Medicines (also known as Narcotics)  What You  Need to Know    What are opioids?   Opioids are pain medicines that must be prescribed by a doctor.  They are also known as narcotics.    Examples are:     morphine (MS Contin, Oxana)    oxycodone (Oxycontin)    oxycodone and acetaminophen (Percocet)    hydrocodone and acetaminophen (Vicodin, Norco)     fentanyl patch (Duragesic)     hydromorphone (Dilaudid)     methadone     What do opioids do well?   Opioids are best for short-term pain after a surgery or injury. They also work well for cancer pain. Unlike other pain medicines, they do not cause liver or kidney failure or ulcers. They may help some people with long-lasting (chronic) pain.     What do opioids NOT do well?   Opioids never get rid of pain entirely, and they do not work well for most patients with chronic pain. Opioids do not reduce swelling, one of the causes of pain. They also dont work well for nerve pain.                           For informational purposes only.  Not to replace the advice of your care provider.  Copyright [address] [facility]. All right reserved. [employer] 400356-Rxg 02/18.      Page 2 of 2    Risks and side effects   Talk to your doctor before you start or decide to keep taking one of these medicines. Side effects include:    Lowering your breathing rate enough to cause death    Overdose, including death, especially if taking higher than prescribed doses    Long-term opioid use    Worse depression symptoms; less pleasure in things you usually enjoy    Feeling tired or sluggish    Slower thoughts or cloudy thinking    Being more sensitive to pain over time; pain is harder to control    Trouble sleeping or restless sleep    Changes in hormone levels (for example, less testosterone)    Changes in sex drive or ability to have sex    Constipation    Unsafe driving    Itching and sweating    Feeling dizzy    Nausea, vomiting and dry mouth    What else should I know about opioids?  When someone takes opioids for too long or  too often, they become dependent. This means that if you stop or reduce the medicine too quickly, you will have withdrawal symptoms.    Dependence is not the same as addiction. Addiction is when people keep using a substance that harms their body, their mind or their relations with others. If you have a history of drug or alcohol abuse, taking opioids can cause a relapse.    Over time, opioids dont work as well. Most people will need higher and higher doses. The higher the dose, the more serious the side effects. We dont know the long-term effects of opioids.      Prescribed opioids aren't the best way to manage chronic pain    Other ways to manage pain include:      Ibuprofen or acetaminophen.  You should always try this first.      Treat health problems that may be causing pain.      acupuncture or massage, deep breathing, meditation, visual imagery, aromatherapy.      Use heat or ice at the pain site      Physical therapy and exercise      Stop smoking      See a counselor or therapist                                                  People who have used opioids for a long time may have a lower quality of life, worse depression, higher levels of pain and more visits to doctors.    Never share your opioids with others. Be sure to store opioids in a secure place, locked if possible.Young children can easily swallow them and overdose.     You can overdose on opioids.  Signs of overdose include decrease or loss of consciousness, slowed breathing, trouble waking and blue lips.  If someone is worried about overdose, they should call 911.    If you are at risk for overdose, you may get naloxone (Narcan, a medicine that reverses the effects of opioids.  If you overdose, a friend or family member can give you Narcan while waiting for the ambulance.  They need to know the signs of overdose and how to give Narcan.    While you're taking opioids:    Don't use alcohol or street drugs. Taking them together can cause  death.    Don't take any of these medicines unless your doctor says its okay.  Taking these with opioids can cause death.    Benzodiazepines (such as lorazepam         or diazepam)    Muscle relaxers (such as cyclobenzaprine)    sleeping pills    other opioids    Safe disposal of opioids  Find your area drug take-back program, your pharmacy mail-back program, buy a special disposal bag (such as Deterra) from your pharmacy or flush them down the toilet.  Use the guidelines at:  www.fda.gov/drugs/resourcesforyou

## 2021-06-25 NOTE — TELEPHONE ENCOUNTER
RN cannot approve Refill Request    RN can NOT refill this medication medication not on med list. Last office visit: 5/24/2021 Lilly Catalan MD Last Physical: 8/11/2020 Last MTM visit: Visit date not found Last visit same specialty: 5/24/2021 Lilly Catalan MD.  Next visit within 3 mo: Visit date not found  Next physical within 3 mo: Visit date not found      [facility] Triage/Med Refill 6/5/2021    Requested Prescriptions   Pending Prescriptions Disp Refills     venlafaxine (EFFEXOR-XR) 37.5 MG 24 hr capsule [Pharmacy Med Name: VENLAFAXINE ER 37.5MG CAPSULES] 30 capsule 2     Sig: TAKE 1 CAPSULE(37.5 MG) BY MOUTH DAILY       Venlafaxine/Desvenlafaxine Refill Protocol Passed - 6/4/2021  3:17 AM        Passed - LFT or AST or ALT in last year     Albumin   Date Value Ref Range Status   08/11/2020 4.7 3.5 - 5.0 g/dL Final     Bilirubin, Total   Date Value Ref Range Status   08/11/2020 1.3 (H) 0.0 - 1.0 mg/dL Final     Bilirubin, Direct   Date Value Ref Range Status   06/01/2018 0.3 <=0.5 mg/dL Final     Alkaline Phosphatase   Date Value Ref Range Status   08/11/2020 71 45 - 120 U/L Final     AST   Date Value Ref Range Status   08/11/2020 19 0 - 40 U/L Final     ALT   Date Value Ref Range Status   08/11/2020 18 0 - 45 U/L Final     Protein, Total   Date Value Ref Range Status   08/11/2020 7.5 6.0 - 8.0 g/dL Final                Passed - Fasting lipid cascade in last year     Cholesterol   Date Value Ref Range Status   08/11/2020 124 <=199 mg/dL Final     Triglycerides   Date Value Ref Range Status   08/11/2020 58 <=149 mg/dL Final     HDL Cholesterol   Date Value Ref Range Status   08/11/2020 53 >=40 mg/dL Final     LDL Calculated   Date Value Ref Range Status   08/11/2020 59 <=129 mg/dL Final     Patient Fasting > 8hrs?   Date Value Ref Range Status   08/11/2020 Yes  Final             Passed - PCP or prescribing provider visit in last year     Last office visit with  prescriber/PCP: 5/24/2021 Lilly Catalan MD OR same dept: 5/24/2021 Lilly Catalan MD OR same specialty: 5/24/2021 Lilly Catalan MD  Last physical: 8/11/2020 Last MTM visit: Visit date not found   Next visit within 3 mo: Visit date not found  Next physical within 3 mo: Visit date not found  Prescriber OR PCP: Lilly Catalan MD  Last diagnosis associated with med order: 1. PTSD (post-traumatic stress disorder)  - venlafaxine (EFFEXOR-XR) 37.5 MG 24 hr capsule [Pharmacy Med Name: VENLAFAXINE ER 37.5MG CAPSULES]; TAKE 1 CAPSULE(37.5 MG) BY MOUTH DAILY  Dispense: 30 capsule; Refill: 2    If protocol passes may refill for 12 months if within 3 months of last provider visit (or a total of 15 months).             Passed - Blood Pressure in last year     BP Readings from Last 1 Encounters:   05/24/21 106/68

## 2021-06-26 ENCOUNTER — HEALTH MAINTENANCE LETTER (OUTPATIENT)
Age: 36
End: 2021-06-26

## 2021-06-26 NOTE — TELEPHONE ENCOUNTER
Medication: Adderall XR 30mg  Last Date Filled 5/19/21  Last appointment addressing medication use: 5/24/21      Taken as prescribed from physician notes? YES    CSA in last year: YES    Random Utox in last year: NO  (if any of the above answer NO - schedule with PCP)     Opioids + benzodiazepines? NO  (if the above answer YES - schedule with PCP every 6 months)       All responses suggest: Scheduling with PCP for further intervention

## 2021-07-02 ENCOUNTER — RECORDS - HEALTHEAST (OUTPATIENT)
Dept: ADMINISTRATIVE | Facility: OTHER | Age: 36
End: 2021-07-02

## 2021-07-02 DIAGNOSIS — F43.10 PTSD (POST-TRAUMATIC STRESS DISORDER): ICD-10-CM

## 2021-07-02 RX ORDER — VENLAFAXINE HYDROCHLORIDE 150 MG/1
150 CAPSULE, EXTENDED RELEASE ORAL DAILY
Qty: 90 CAPSULE | Refills: 1 | Status: SHIPPED | OUTPATIENT
Start: 2021-07-02 | End: 2021-07-22 | Stop reason: DRUGHIGH

## 2021-07-03 NOTE — ADDENDUM NOTE
Addendum Note by Mango Paredes at 5/3/2017  5:41 PM     Author: Mango Paredes Service: -- Author Type:     Filed: 5/3/2017  5:41 PM Encounter Date: 5/2/2017 Status: Signed    : Mango Paredes ()    Addended by: MANGO PAREDES on: 5/3/2017 05:41 PM        Modules accepted: Orders

## 2021-07-03 NOTE — ANESTHESIA PREPROCEDURE EVALUATION
Anesthesia Preprocedure Evaluation by Elliott Benitez MD at 5/3/2017  2:58 PM     Author: Elliott Benitez MD Service: -- Author Type: Physician    Filed: 5/3/2017  2:59 PM Date of Service: 5/3/2017  2:58 PM Status: Signed    : Elliott Benitez MD (Physician)       Anesthesia Evaluation        Airway   Mallampati: II  Neck ROM: full   Pulmonary - normal exam   (+) a smoker (ex-smoker)                         Cardiovascular - negative ROS and normal exam   Neuro/Psych - negative ROS     Endo/Other - negative ROS      GI/Hepatic/Renal            Dental - normal exam                            Anesthesia Plan  Planned anesthetic: general LMA    ASA 1   Induction: intravenous   Anesthetic plan and risks discussed with: patient    Post-op plan: routine recovery

## 2021-07-06 VITALS
BODY MASS INDEX: 21.05 KG/M2 | WEIGHT: 150.38 LBS | HEIGHT: 71 IN | OXYGEN SATURATION: 98 % | HEART RATE: 73 BPM | DIASTOLIC BLOOD PRESSURE: 68 MMHG | SYSTOLIC BLOOD PRESSURE: 106 MMHG

## 2021-07-06 ASSESSMENT — PATIENT HEALTH QUESTIONNAIRE - PHQ9: SUM OF ALL RESPONSES TO PHQ QUESTIONS 1-9: 7

## 2021-07-08 ASSESSMENT — ANXIETY QUESTIONNAIRES: GAD7 TOTAL SCORE: 7

## 2021-07-19 ENCOUNTER — MYC MEDICAL ADVICE (OUTPATIENT)
Dept: FAMILY MEDICINE | Facility: CLINIC | Age: 36
End: 2021-07-19

## 2021-07-19 DIAGNOSIS — F90.9 ATTENTION DEFICIT HYPERACTIVITY DISORDER (ADHD), UNSPECIFIED ADHD TYPE: ICD-10-CM

## 2021-07-20 ENCOUNTER — MYC MEDICAL ADVICE (OUTPATIENT)
Dept: FAMILY MEDICINE | Facility: CLINIC | Age: 36
End: 2021-07-20

## 2021-07-20 DIAGNOSIS — F43.10 PTSD (POST-TRAUMATIC STRESS DISORDER): ICD-10-CM

## 2021-07-20 NOTE — TELEPHONE ENCOUNTER
Medication: Adderall XR 30mg   Last Date Filled 6/18/21  Last appointment addressing medication use: 5/24/21      Taken as prescribed from physician notes? YES    CSA in last year: YES    Random Utox in last year: NO  (if any of the above answer NO - schedule with PCP)     Opioids + benzodiazepines? NO  (if the above answer YES - schedule with PCP every 6 months)       All responses suggest: Scheduling with PCP for further intervention       Patient notified he's due for f/u visit.

## 2021-07-21 RX ORDER — DEXTROAMPHETAMINE SACCHARATE, AMPHETAMINE ASPARTATE MONOHYDRATE, DEXTROAMPHETAMINE SULFATE AND AMPHETAMINE SULFATE 7.5; 7.5; 7.5; 7.5 MG/1; MG/1; MG/1; MG/1
30 CAPSULE, EXTENDED RELEASE ORAL DAILY
Qty: 30 CAPSULE | Refills: 0 | Status: SHIPPED | OUTPATIENT
Start: 2021-07-21 | End: 2021-08-20

## 2021-07-22 RX ORDER — VENLAFAXINE HYDROCHLORIDE 75 MG/1
75 CAPSULE, EXTENDED RELEASE ORAL DAILY
Qty: 90 CAPSULE | Refills: 1 | Status: SHIPPED | OUTPATIENT
Start: 2021-07-22 | End: 2021-09-08

## 2021-08-17 DIAGNOSIS — I10 ESSENTIAL HYPERTENSION: ICD-10-CM

## 2021-08-17 DIAGNOSIS — F90.9 ATTENTION DEFICIT HYPERACTIVITY DISORDER (ADHD), UNSPECIFIED ADHD TYPE: ICD-10-CM

## 2021-08-20 RX ORDER — LISINOPRIL 10 MG/1
TABLET ORAL
Qty: 30 TABLET | Refills: 0 | Status: SHIPPED | OUTPATIENT
Start: 2021-08-20 | End: 2021-09-08

## 2021-08-20 RX ORDER — DEXTROAMPHETAMINE SACCHARATE, AMPHETAMINE ASPARTATE MONOHYDRATE, DEXTROAMPHETAMINE SULFATE AND AMPHETAMINE SULFATE 7.5; 7.5; 7.5; 7.5 MG/1; MG/1; MG/1; MG/1
30 CAPSULE, EXTENDED RELEASE ORAL DAILY
Qty: 30 CAPSULE | Refills: 0 | Status: SHIPPED | OUTPATIENT
Start: 2021-08-20 | End: 2021-09-08

## 2021-08-20 NOTE — TELEPHONE ENCOUNTER
"Routing refill request to provider for review/approval because:  Labs not current:  Multiple     Last Written Prescription Date:  8/11/20  Last Fill Quantity: 90,  # refills: 3   Last office visit provider:  5/24/21     Requested Prescriptions   Pending Prescriptions Disp Refills     lisinopril (ZESTRIL) 10 MG tablet [Pharmacy Med Name: LISINOPRIL 10MG TABLETS] 90 tablet 3     Sig: TAKE 1 TABLET(10 MG) BY MOUTH DAILY       ACE Inhibitors (Including Combos) Protocol Failed - 8/20/2021 12:16 PM        Failed - Normal serum creatinine on file in past 12 months     Recent Labs   Lab Test 08/11/20  1052   CR 0.87       Ok to refill medication if creatinine is low          Failed - Normal serum potassium on file in past 12 months     Recent Labs   Lab Test 08/11/20  1052   POTASSIUM 3.8             Passed - Blood pressure under 140/90 in past 12 months     BP Readings from Last 3 Encounters:   05/24/21 106/68   04/19/21 110/64   03/29/21 118/70                 Passed - Recent (12 mo) or future (30 days) visit within the authorizing provider's specialty     Patient has had an office visit with the authorizing provider or a provider within the authorizing providers department within the previous 12 mos or has a future within next 30 days. See \"Patient Info\" tab in inbasket, or \"Choose Columns\" in Meds & Orders section of the refill encounter.              Passed - Medication is active on med list        Passed - Patient is age 18 or older           amphetamine-dextroamphetamine (ADDERALL XR) 30 MG 24 hr capsule 30 capsule 0     Sig: Take 1 capsule (30 mg) by mouth daily       There is no refill protocol information for this order          Lui Chávez RN 08/20/21 12:36 PM  "

## 2021-08-20 NOTE — TELEPHONE ENCOUNTER
"Routing refill request to provider for review/approval because:  Controlled substance request    Last Written Prescription Date:  7/21/21  Last Fill Quantity: 30,  # refills: 0   Last office visit provider:  5/24/21     Requested Prescriptions   Pending Prescriptions Disp Refills     lisinopril (ZESTRIL) 10 MG tablet [Pharmacy Med Name: LISINOPRIL 10MG TABLETS] 90 tablet 3     Sig: TAKE 1 TABLET(10 MG) BY MOUTH DAILY       ACE Inhibitors (Including Combos) Protocol Failed - 8/20/2021 12:16 PM        Failed - Normal serum creatinine on file in past 12 months     Recent Labs   Lab Test 08/11/20  1052   CR 0.87       Ok to refill medication if creatinine is low          Failed - Normal serum potassium on file in past 12 months     Recent Labs   Lab Test 08/11/20  1052   POTASSIUM 3.8             Passed - Blood pressure under 140/90 in past 12 months     BP Readings from Last 3 Encounters:   05/24/21 106/68   04/19/21 110/64   03/29/21 118/70                 Passed - Recent (12 mo) or future (30 days) visit within the authorizing provider's specialty     Patient has had an office visit with the authorizing provider or a provider within the authorizing providers department within the previous 12 mos or has a future within next 30 days. See \"Patient Info\" tab in inbasket, or \"Choose Columns\" in Meds & Orders section of the refill encounter.              Passed - Medication is active on med list        Passed - Patient is age 18 or older           amphetamine-dextroamphetamine (ADDERALL XR) 30 MG 24 hr capsule 30 capsule 0     Sig: Take 1 capsule (30 mg) by mouth daily       There is no refill protocol information for this order          Lui Chávez RN 08/20/21 12:37 PM  "

## 2021-09-08 ENCOUNTER — OFFICE VISIT (OUTPATIENT)
Dept: FAMILY MEDICINE | Facility: CLINIC | Age: 36
End: 2021-09-08
Payer: COMMERCIAL

## 2021-09-08 VITALS
HEART RATE: 76 BPM | SYSTOLIC BLOOD PRESSURE: 112 MMHG | DIASTOLIC BLOOD PRESSURE: 70 MMHG | BODY MASS INDEX: 20.92 KG/M2 | OXYGEN SATURATION: 99 % | WEIGHT: 150 LBS

## 2021-09-08 DIAGNOSIS — F90.9 ATTENTION DEFICIT HYPERACTIVITY DISORDER (ADHD), UNSPECIFIED ADHD TYPE: ICD-10-CM

## 2021-09-08 DIAGNOSIS — R10.9 CHRONIC ABDOMINAL PAIN: Primary | ICD-10-CM

## 2021-09-08 DIAGNOSIS — I10 ESSENTIAL HYPERTENSION: ICD-10-CM

## 2021-09-08 DIAGNOSIS — G89.29 OTHER CHRONIC PAIN: ICD-10-CM

## 2021-09-08 DIAGNOSIS — G89.29 CHRONIC ABDOMINAL PAIN: Primary | ICD-10-CM

## 2021-09-08 LAB
ALBUMIN SERPL-MCNC: 4.7 G/DL (ref 3.5–5)
ALP SERPL-CCNC: 73 U/L (ref 45–120)
ALT SERPL W P-5'-P-CCNC: 21 U/L (ref 0–45)
ANION GAP SERPL CALCULATED.3IONS-SCNC: 12 MMOL/L (ref 5–18)
AST SERPL W P-5'-P-CCNC: 24 U/L (ref 0–40)
BASOPHILS # BLD AUTO: 0.1 10E3/UL (ref 0–0.2)
BASOPHILS NFR BLD AUTO: 1 %
BILIRUB SERPL-MCNC: 1.5 MG/DL (ref 0–1)
BUN SERPL-MCNC: 8 MG/DL (ref 8–22)
C REACTIVE PROTEIN LHE: <0.1 MG/DL (ref 0–0.8)
CALCIUM SERPL-MCNC: 9.4 MG/DL (ref 8.5–10.5)
CHLORIDE BLD-SCNC: 102 MMOL/L (ref 98–107)
CO2 SERPL-SCNC: 25 MMOL/L (ref 22–31)
CREAT SERPL-MCNC: 0.91 MG/DL (ref 0.7–1.3)
EOSINOPHIL # BLD AUTO: 0.2 10E3/UL (ref 0–0.7)
EOSINOPHIL NFR BLD AUTO: 3 %
ERYTHROCYTE [DISTWIDTH] IN BLOOD BY AUTOMATED COUNT: 11.8 % (ref 10–15)
ERYTHROCYTE [SEDIMENTATION RATE] IN BLOOD BY WESTERGREN METHOD: 2 MM/HR (ref 0–15)
GFR SERPL CREATININE-BSD FRML MDRD: >90 ML/MIN/1.73M2
GLUCOSE BLD-MCNC: 94 MG/DL (ref 70–125)
HCT VFR BLD AUTO: 41 % (ref 40–53)
HGB BLD-MCNC: 14.2 G/DL (ref 13.3–17.7)
IMM GRANULOCYTES # BLD: 0 10E3/UL
IMM GRANULOCYTES NFR BLD: 0 %
LYMPHOCYTES # BLD AUTO: 1.7 10E3/UL (ref 0.8–5.3)
LYMPHOCYTES NFR BLD AUTO: 32 %
MCH RBC QN AUTO: 30.9 PG (ref 26.5–33)
MCHC RBC AUTO-ENTMCNC: 34.6 G/DL (ref 31.5–36.5)
MCV RBC AUTO: 89 FL (ref 78–100)
MONOCYTES # BLD AUTO: 0.5 10E3/UL (ref 0–1.3)
MONOCYTES NFR BLD AUTO: 9 %
NEUTROPHILS # BLD AUTO: 2.9 10E3/UL (ref 1.6–8.3)
NEUTROPHILS NFR BLD AUTO: 55 %
PLATELET # BLD AUTO: 258 10E3/UL (ref 150–450)
POTASSIUM BLD-SCNC: 3.5 MMOL/L (ref 3.5–5)
PROT SERPL-MCNC: 7.4 G/DL (ref 6–8)
RBC # BLD AUTO: 4.59 10E6/UL (ref 4.4–5.9)
RHEUMATOID FACT SER NEPH-ACNC: <15 IU/ML (ref 0–30)
SODIUM SERPL-SCNC: 139 MMOL/L (ref 136–145)
WBC # BLD AUTO: 5.3 10E3/UL (ref 4–11)

## 2021-09-08 PROCEDURE — 86431 RHEUMATOID FACTOR QUANT: CPT | Performed by: FAMILY MEDICINE

## 2021-09-08 PROCEDURE — 85652 RBC SED RATE AUTOMATED: CPT | Performed by: FAMILY MEDICINE

## 2021-09-08 PROCEDURE — 86141 C-REACTIVE PROTEIN HS: CPT | Performed by: FAMILY MEDICINE

## 2021-09-08 PROCEDURE — 80053 COMPREHEN METABOLIC PANEL: CPT | Performed by: FAMILY MEDICINE

## 2021-09-08 PROCEDURE — 36415 COLL VENOUS BLD VENIPUNCTURE: CPT | Performed by: FAMILY MEDICINE

## 2021-09-08 PROCEDURE — 85025 COMPLETE CBC W/AUTO DIFF WBC: CPT | Performed by: FAMILY MEDICINE

## 2021-09-08 PROCEDURE — 86618 LYME DISEASE ANTIBODY: CPT | Performed by: FAMILY MEDICINE

## 2021-09-08 PROCEDURE — 86038 ANTINUCLEAR ANTIBODIES: CPT | Performed by: FAMILY MEDICINE

## 2021-09-08 PROCEDURE — 99214 OFFICE O/P EST MOD 30 MIN: CPT | Performed by: FAMILY MEDICINE

## 2021-09-08 RX ORDER — DICYCLOMINE HYDROCHLORIDE 10 MG/1
10-20 CAPSULE ORAL
Qty: 60 CAPSULE | Refills: 1 | Status: SHIPPED | OUTPATIENT
Start: 2021-09-08 | End: 2021-10-19

## 2021-09-08 RX ORDER — LISINOPRIL 10 MG/1
10 TABLET ORAL DAILY
Qty: 90 TABLET | Refills: 3 | Status: SHIPPED | OUTPATIENT
Start: 2021-09-08 | End: 2022-05-30

## 2021-09-08 RX ORDER — DEXTROAMPHETAMINE SACCHARATE, AMPHETAMINE ASPARTATE MONOHYDRATE, DEXTROAMPHETAMINE SULFATE AND AMPHETAMINE SULFATE 7.5; 7.5; 7.5; 7.5 MG/1; MG/1; MG/1; MG/1
30 CAPSULE, EXTENDED RELEASE ORAL 2 TIMES DAILY
Qty: 60 CAPSULE | Refills: 0 | Status: SHIPPED | OUTPATIENT
Start: 2021-09-08 | End: 2021-10-13

## 2021-09-08 NOTE — PROGRESS NOTES
Assessment & Plan     Chronic abdominal pain  -Continues to have significant abdominal pain to the point of crying at times.  We will have him trial dicyclomine prior to eating for now, referral for GI placed again for further evaluation and assessment.  May need to consider pain clinic as well.  - Adult Gastro Ref - Consult Only  - dicyclomine (BENTYL) 10 MG capsule  Dispense: 60 capsule; Refill: 1    Essential hypertension  -Blood pressure under good control today, he will continue his current medications.  - lisinopril (ZESTRIL) 10 MG tablet  Dispense: 90 tablet; Refill: 3    Attention deficit hyperactivity disorder (ADHD), unspecified ADHD type  -Significant improvement in his mood with the Adderall however this does wear off midday and he has increasing irritability with this.  Plan on doubling up to twice daily of his Adderall for now, and again follow-up in approximately 3 months for recheck if doing well.  -If this does not help as much as we would like it to we could consider trialing Concerta.  - amphetamine-dextroamphetamine (ADDERALL XR) 30 MG 24 hr capsule  Dispense: 60 capsule; Refill: 0    Other chronic pain  -Continues to have longstanding issues with widespread joint aches and pains.  We will check basic labs as listed below to look for signs of obvious autoimmune etiologies.  If he continues to struggle with issues may need to consider referral to the pain clinic.  - Rheumatoid factor  - Anti Nuclear Anca IgG by IFA with Reflex  - ESR: Erythrocyte sedimentation rate  - CRP, inflammation  - CBC with platelets and differential  - Comprehensive metabolic panel (BMP + Alb, Alk Phos, ALT, AST, Total. Bili, TP)  - Lyme Disease Anca with reflex to WB Serum  - Rheumatoid factor  - Anti Nuclear Anca IgG by IFA with Reflex  - ESR: Erythrocyte sedimentation rate  - CRP, inflammation  - CBC with platelets and differential  - Comprehensive metabolic panel (BMP + Alb, Alk Phos, ALT, AST, Total. Bili, TP)  -  Lyme Disease Anca with reflex to WB Serum                   Return in about 6 weeks (around 10/20/2021) for Follow up.    Lilly Catalan MD  [facility] MARIA Parry is a 35 year old who presents for the following health issues     HPI     He weaned off of the venlafaxine. He was feeling more depressed. He was not able to express his feelings the way that he wanted to say things. He does think that that it was having an impact on his expression.     They do think that a lot of the depression was after Kathie's diagnosis. He does get flustered and then angry with this. He does get overwhelmed and flustered with the diabetes diagnosis.With the anger he would then get really sad due to getting upset. He does have a routine, if he deviates from the routine he does get overwhelmed.     He does continue to hurt quite a bit with his pain. He does at times feel ok, but then other times he is having issues. He has had two appointments rescheduled with GI. He wants to support his family. It is starting to affect the quality of life. He does cry due to pain at times in the bathroom.     He does have wide spread pain in his body, has a hard time with pain all over. He does have issues picking up his kids at times. He does have pain in his legs as well at times. He does have aching in his legs. Sometimes the pain controls what he is thinking about. He started smoking pot due to his pain. He does have a history of hand pain at a young age.       Review of Systems   With the exception of the aforementioned issues, 12 point, comprehensive ROS was done and was negative.       Objective    /70   Pulse 76   Wt 68 kg (150 lb)   SpO2 99%   BMI 20.92 kg/m    Body mass index is 20.92 kg/m .  Physical Exam   GENERAL: healthy, alert and no distress  NECK: no adenopathy, no asymmetry, masses, or scars and thyroid normal to palpation  MS: no gross musculoskeletal defects noted, no edema  PSYCH:  tearful, anxious, judgement and insight intact, appearance well groomed and irritable at times

## 2021-09-09 LAB — B BURGDOR IGG+IGM SER QL: 0.11

## 2021-09-10 LAB — ANA SER QL IF: NEGATIVE

## 2021-09-21 ENCOUNTER — TRANSFERRED RECORDS (OUTPATIENT)
Dept: HEALTH INFORMATION MANAGEMENT | Facility: CLINIC | Age: 36
End: 2021-09-21

## 2021-10-13 ENCOUNTER — MYC MEDICAL ADVICE (OUTPATIENT)
Dept: FAMILY MEDICINE | Facility: CLINIC | Age: 36
End: 2021-10-13

## 2021-10-13 DIAGNOSIS — F90.9 ATTENTION DEFICIT HYPERACTIVITY DISORDER (ADHD), UNSPECIFIED ADHD TYPE: ICD-10-CM

## 2021-10-13 RX ORDER — DEXTROAMPHETAMINE SACCHARATE, AMPHETAMINE ASPARTATE MONOHYDRATE, DEXTROAMPHETAMINE SULFATE AND AMPHETAMINE SULFATE 7.5; 7.5; 7.5; 7.5 MG/1; MG/1; MG/1; MG/1
30 CAPSULE, EXTENDED RELEASE ORAL 2 TIMES DAILY
Qty: 60 CAPSULE | Refills: 0 | Status: SHIPPED | OUTPATIENT
Start: 2021-10-13 | End: 2021-11-16

## 2021-10-13 NOTE — TELEPHONE ENCOUNTER
Medication: Adderall XR 30mg  Last Date Filled 9/8/21  Last appointment addressing medication use: 9/8/2021      Taken as prescribed from physician notes?     CSA in last year: YES    Random Utox in last year: NO  (if any of the above answer NO - schedule with PCP)     Opioids + benzodiazepines? NO  (if the above answer YES - schedule with PCP every 6 months)       All responses suggest: Scheduling with PCP for further intervention

## 2021-10-16 ENCOUNTER — HEALTH MAINTENANCE LETTER (OUTPATIENT)
Age: 36
End: 2021-10-16

## 2021-10-19 ENCOUNTER — TELEPHONE (OUTPATIENT)
Dept: FAMILY MEDICINE | Facility: CLINIC | Age: 36
End: 2021-10-19

## 2021-10-19 ENCOUNTER — OFFICE VISIT (OUTPATIENT)
Dept: FAMILY MEDICINE | Facility: CLINIC | Age: 36
End: 2021-10-19
Payer: COMMERCIAL

## 2021-10-19 VITALS
SYSTOLIC BLOOD PRESSURE: 102 MMHG | OXYGEN SATURATION: 95 % | BODY MASS INDEX: 21.2 KG/M2 | HEART RATE: 64 BPM | DIASTOLIC BLOOD PRESSURE: 70 MMHG | WEIGHT: 152 LBS

## 2021-10-19 DIAGNOSIS — F43.10 PTSD (POST-TRAUMATIC STRESS DISORDER): ICD-10-CM

## 2021-10-19 DIAGNOSIS — G89.29 CHRONIC ABDOMINAL PAIN: Primary | ICD-10-CM

## 2021-10-19 DIAGNOSIS — R10.9 CHRONIC ABDOMINAL PAIN: Primary | ICD-10-CM

## 2021-10-19 PROCEDURE — 99213 OFFICE O/P EST LOW 20 MIN: CPT | Performed by: FAMILY MEDICINE

## 2021-10-19 RX ORDER — GABAPENTIN 300 MG/1
300 CAPSULE ORAL AT BEDTIME
Qty: 30 CAPSULE | Refills: 1 | Status: SHIPPED | OUTPATIENT
Start: 2021-10-19 | End: 2021-11-11

## 2021-10-19 RX ORDER — LORAZEPAM 0.5 MG/1
0.5 TABLET ORAL EVERY 6 HOURS PRN
Qty: 10 TABLET | Refills: 0 | Status: SHIPPED | OUTPATIENT
Start: 2021-10-19 | End: 2021-11-16

## 2021-10-19 RX ORDER — DICYCLOMINE HYDROCHLORIDE 10 MG/1
10-20 CAPSULE ORAL
Qty: 60 CAPSULE | Refills: 1 | Status: SHIPPED | OUTPATIENT
Start: 2021-10-19 | End: 2022-03-17

## 2021-10-19 NOTE — PROGRESS NOTES
Assessment & Plan     Chronic abdominal pain  -Some improvement with the addition of the dicyclomine.  He is agreeable to adding a small dose of gabapentin as well as his brother is on this with good relief.  He does have an upcoming appointment with gastroenterology for January.  He will follow up with me in approximately 1 month for further evaluation and possible dose escalation.  - gabapentin (NEURONTIN) 300 MG capsule  Dispense: 30 capsule; Refill: 1  - dicyclomine (BENTYL) 10 MG capsule  Dispense: 60 capsule; Refill: 1  - omeprazole (PRILOSEC) 20 MG DR capsule  Dispense: 30 capsule; Refill: 1    PTSD (post-traumatic stress disorder)  -Stable at this time, does have a therapist and is working on mental health psychiatry as well.  Small prescription of lorazepam sent for the patient for now, he will continue on this as well as his Adderall for now  - LORazepam (ATIVAN) 0.5 MG tablet  Dispense: 10 tablet; Refill: 0               No follow-ups on file.    Lilly Catalan MD  [facility]    Megan Parry is a 36 year old who presents for the following health issues     HPI     He is here today for follow up. He is in therapy now and going to work.     The dicyclomine is helping with some of the pain, and is helping with a great deal of the pain with the bowel movements. Even taking it more frequently wasn't helping the upper pain. He is getting by with only one a day. His body hurts constantly as well. He is working all the time and did have lack of sleep. He has been on gabapentin previously. His brother is on gabapentin with good results.     He is doing well with the Adderall. He is able to focus with this.       Review of Systems   Per above      Objective    /70   Pulse 64   Wt 68.9 kg (152 lb)   SpO2 95%   BMI 21.20 kg/m    Body mass index is 21.2 kg/m .  Physical Exam   GENERAL: healthy, alert and no distress  MS: no gross musculoskeletal defects noted, no  edema  PSYCH: mentation appears normal, affect flat and guarded, judgement and insight intact and appearance well groomed

## 2021-10-19 NOTE — TELEPHONE ENCOUNTER
Forms Request  Name of form/paperwork: [employer]   Have you been seen for this request: N/A  Do we have the form: YES IN DR JACINTO INBOX  When is form needed by: WHEN DONE  How would you like the form returned: FAX  Patient Notified form requests are processed in 3-5 business days: NO    Okay to leave a detailed message? NO

## 2021-11-14 ENCOUNTER — MYC REFILL (OUTPATIENT)
Dept: FAMILY MEDICINE | Facility: CLINIC | Age: 36
End: 2021-11-14
Payer: COMMERCIAL

## 2021-11-14 DIAGNOSIS — F43.10 PTSD (POST-TRAUMATIC STRESS DISORDER): ICD-10-CM

## 2021-11-14 DIAGNOSIS — F90.9 ATTENTION DEFICIT HYPERACTIVITY DISORDER (ADHD), UNSPECIFIED ADHD TYPE: ICD-10-CM

## 2021-11-14 DIAGNOSIS — S92.404S CLOSED NONDISPLACED FRACTURE OF PHALANX OF RIGHT GREAT TOE, UNSPECIFIED PHALANX, SEQUELA: ICD-10-CM

## 2021-11-16 RX ORDER — DEXTROAMPHETAMINE SACCHARATE, AMPHETAMINE ASPARTATE MONOHYDRATE, DEXTROAMPHETAMINE SULFATE AND AMPHETAMINE SULFATE 7.5; 7.5; 7.5; 7.5 MG/1; MG/1; MG/1; MG/1
30 CAPSULE, EXTENDED RELEASE ORAL 2 TIMES DAILY
Qty: 60 CAPSULE | Refills: 0 | Status: CANCELLED | OUTPATIENT
Start: 2021-11-16

## 2021-11-16 RX ORDER — IBUPROFEN 800 MG/1
800 TABLET, FILM COATED ORAL EVERY 8 HOURS PRN
Qty: 270 TABLET | Refills: 3 | Status: SHIPPED | OUTPATIENT
Start: 2021-11-16

## 2021-11-16 RX ORDER — LORAZEPAM 0.5 MG/1
0.5 TABLET ORAL EVERY 6 HOURS PRN
Qty: 10 TABLET | Refills: 0 | Status: CANCELLED | OUTPATIENT
Start: 2021-11-16

## 2021-11-16 NOTE — TELEPHONE ENCOUNTER
"Routing refill request to provider for review/approval because:  Controlled substance request    Last Written Prescription Date:  10/13/21  Last Fill Quantity: 60,  # refills: 0   Last office visit provider:  10/19/21     Last Written Prescription Date:  10/19/21  Last Fill Quantity: 10,  # refills: 0   Last office visit provider:  10/19/21    Requested Prescriptions   Pending Prescriptions Disp Refills     amphetamine-dextroamphetamine (ADDERALL XR) 30 MG 24 hr capsule 60 capsule 0     Sig: Take 1 capsule (30 mg) by mouth 2 times daily       There is no refill protocol information for this order        LORazepam (ATIVAN) 0.5 MG tablet 10 tablet 0     Sig: Take 1 tablet (0.5 mg) by mouth every 6 hours as needed for anxiety       There is no refill protocol information for this order      Signed Prescriptions Disp Refills    ibuprofen (ADVIL/MOTRIN) 800 MG tablet 270 tablet 3     Sig: Take 1 tablet (800 mg) by mouth every 8 hours as needed       NSAID Medications Passed - 11/14/2021 11:28 AM        Passed - Blood pressure under 140/90 in past 12 months     BP Readings from Last 3 Encounters:   10/19/21 102/70   09/08/21 112/70   05/24/21 106/68                 Passed - Normal ALT on file in past 12 months     Recent Labs   Lab Test 09/08/21  1417   ALT 21             Passed - Normal AST on file in past 12 months     Recent Labs   Lab Test 09/08/21  1417   AST 24             Passed - Recent (12 mo) or future (30 days) visit within the authorizing provider's specialty     Patient has had an office visit with the authorizing provider or a provider within the authorizing providers department within the previous 12 mos or has a future within next 30 days. See \"Patient Info\" tab in inbasket, or \"Choose Columns\" in Meds & Orders section of the refill encounter.              Passed - Patient is age 6-64 years        Passed - Normal CBC on file in past 12 months     Recent Labs   Lab Test 09/08/21  1417   WBC 5.3   RBC 4.59 "   HGB 14.2   HCT 41.0                    Passed - Medication is active on med list        Passed - Normal serum creatinine on file in past 12 months     Recent Labs   Lab Test 09/08/21  1417   CR 0.91       Ok to refill medication if creatinine is low               Lui Chávez RN 11/16/21 9:29 AM

## 2021-11-16 NOTE — TELEPHONE ENCOUNTER
"Last Written Prescription Date:  3/2/21  Last Fill Quantity: 60,  # refills: 0   Last office visit provider:  10/19/21     Requested Prescriptions   Pending Prescriptions Disp Refills     ibuprofen (ADVIL/MOTRIN) 800 MG tablet 60 tablet 0     Sig: Take 1 tablet (800 mg) by mouth every 8 hours as needed       NSAID Medications Passed - 11/14/2021 11:28 AM        Passed - Blood pressure under 140/90 in past 12 months     BP Readings from Last 3 Encounters:   10/19/21 102/70   09/08/21 112/70   05/24/21 106/68                 Passed - Normal ALT on file in past 12 months     Recent Labs   Lab Test 09/08/21  1417   ALT 21             Passed - Normal AST on file in past 12 months     Recent Labs   Lab Test 09/08/21  1417   AST 24             Passed - Recent (12 mo) or future (30 days) visit within the authorizing provider's specialty     Patient has had an office visit with the authorizing provider or a provider within the authorizing providers department within the previous 12 mos or has a future within next 30 days. See \"Patient Info\" tab in inbasket, or \"Choose Columns\" in Meds & Orders section of the refill encounter.              Passed - Patient is age 6-64 years        Passed - Normal CBC on file in past 12 months     Recent Labs   Lab Test 09/08/21  1417   WBC 5.3   RBC 4.59   HGB 14.2   HCT 41.0                    Passed - Medication is active on med list        Passed - Normal serum creatinine on file in past 12 months     Recent Labs   Lab Test 09/08/21  1417   CR 0.91       Ok to refill medication if creatinine is low             amphetamine-dextroamphetamine (ADDERALL XR) 30 MG 24 hr capsule 60 capsule 0     Sig: Take 1 capsule (30 mg) by mouth 2 times daily       There is no refill protocol information for this order        LORazepam (ATIVAN) 0.5 MG tablet 10 tablet 0     Sig: Take 1 tablet (0.5 mg) by mouth every 6 hours as needed for anxiety       There is no refill protocol information for this " order          Lui Chávez RN 11/16/21 9:27 AM

## 2021-12-07 ENCOUNTER — OFFICE VISIT (OUTPATIENT)
Dept: FAMILY MEDICINE | Facility: CLINIC | Age: 36
End: 2021-12-07
Payer: COMMERCIAL

## 2021-12-07 VITALS
BODY MASS INDEX: 21.2 KG/M2 | SYSTOLIC BLOOD PRESSURE: 122 MMHG | HEART RATE: 75 BPM | OXYGEN SATURATION: 100 % | DIASTOLIC BLOOD PRESSURE: 80 MMHG | WEIGHT: 152 LBS

## 2021-12-07 DIAGNOSIS — F43.10 PTSD (POST-TRAUMATIC STRESS DISORDER): ICD-10-CM

## 2021-12-07 DIAGNOSIS — G89.29 CHRONIC ABDOMINAL PAIN: ICD-10-CM

## 2021-12-07 DIAGNOSIS — R10.9 CHRONIC ABDOMINAL PAIN: ICD-10-CM

## 2021-12-07 PROCEDURE — 99213 OFFICE O/P EST LOW 20 MIN: CPT | Performed by: FAMILY MEDICINE

## 2021-12-07 RX ORDER — BUSPIRONE HYDROCHLORIDE 5 MG/1
5 TABLET ORAL 2 TIMES DAILY
Qty: 60 TABLET | Refills: 1 | Status: SHIPPED | OUTPATIENT
Start: 2021-12-07 | End: 2022-01-18

## 2021-12-07 RX ORDER — LORAZEPAM 0.5 MG/1
.5-1 TABLET ORAL EVERY 6 HOURS PRN
Qty: 20 TABLET | Refills: 1 | Status: SHIPPED | OUTPATIENT
Start: 2021-12-07 | End: 2022-07-06

## 2021-12-07 RX ORDER — GABAPENTIN 300 MG/1
CAPSULE ORAL
Qty: 90 CAPSULE | Refills: 2 | Status: SHIPPED | OUTPATIENT
Start: 2021-12-07 | End: 2022-01-18

## 2021-12-07 ASSESSMENT — COLUMBIA-SUICIDE SEVERITY RATING SCALE - C-SSRS
1. WITHIN THE PAST MONTH, HAVE YOU WISHED YOU WERE DEAD OR WISHED YOU COULD GO TO SLEEP AND NOT WAKE UP?: YES
2. IN THE PAST MONTH, HAVE YOU ACTUALLY HAD ANY THOUGHTS OF KILLING YOURSELF?: NO
6. HAVE YOU EVER DONE ANYTHING, STARTED TO DO ANYTHING, OR PREPARED TO DO ANYTHING TO END YOUR LIFE?: YES, LIFETIME

## 2021-12-07 ASSESSMENT — PATIENT HEALTH QUESTIONNAIRE - PHQ9
SUM OF ALL RESPONSES TO PHQ QUESTIONS 1-9: 6
10. IF YOU CHECKED OFF ANY PROBLEMS, HOW DIFFICULT HAVE THESE PROBLEMS MADE IT FOR YOU TO DO YOUR WORK, TAKE CARE OF THINGS AT HOME, OR GET ALONG WITH OTHER PEOPLE: VERY DIFFICULT
SUM OF ALL RESPONSES TO PHQ QUESTIONS 1-9: 6

## 2021-12-07 NOTE — PROGRESS NOTES
Assessment & Plan     PTSD (post-traumatic stress disorder)  -Stable, although not significantly better. continues to work with his therapist.   -Will have him trial a very low dose of buspirone as he has been sensitive to medications in the past. He will f/u with me in about 6 weeks for a recheck, calling sooner if issues. Continue with as needed lorazepam for anxiety for now.   - LORazepam (ATIVAN) 0.5 MG tablet  Dispense: 20 tablet; Refill: 1  - busPIRone (BUSPAR) 5 MG tablet  Dispense: 60 tablet; Refill: 1    Chronic abdominal pain  -Significant improvement in his daytime abdominal pain. Does still have a lot of pain upon wakening though. Given this will trial a nighttime dose as well and he will again f/u with me in about 6 weeks for a recheck.   - gabapentin (NEURONTIN) 300 MG capsule  Dispense: 90 capsule; Refill: 2           Depression Screening Follow Up    PHQ 12/7/2021   PHQ-9 Total Score 6   Q9: Thoughts of better off dead/self-harm past 2 weeks Several days   F/U: Thoughts of suicide or self-harm No   F/U: Safety concerns No           C-SSRS (Brief [place]) 12/7/2021   Within the last month, have you wished you were dead or wished you could go to sleep and not wake up? Yes   Within the last month, have you had any actual thoughts of killing yourself? No   Within the last month, have you ever done anything, started to do anything, or prepared to do anything to end your life? Yes, lifetime       Follow Up       Follow Up Actions Taken  patient will continue to follow with therapist and we will adjust  medications and f/u in six weeks    Discussed the following ways the patient can remain in a safe environment:  be around others as able      Return in about 6 weeks (around 1/18/2022).    Lilly Catalan MD  [facility] MARIA Parry is a 36 year old who presents for the following health issues     History of Present Illness       He eats 0-1 servings of fruits and  vegetables daily.He consumes 8 sweetened beverage(s) daily.He exercises with enough effort to increase his heart rate 60 or more minutes per day.  He exercises with enough effort to increase his heart rate 5 days per week.   He is taking medications regularly.           He is continuing to be quite sad. He is still working on trying to make her happy and is frustrated about how things are working for him.     He does note that his pain is much better with the gabapentin. He is taking two gabapentin, two Adderall and one lisinopril and one lorazepam. Since his wife has been gone he has been having a ton of panic attacks. He is good through the rest of the day. He has been taking on a bad day 2-3 lorazepam. He is doing therapy, on Tuesdays. He does feel like half of him is gone without his wife. He has tried to get her to do couples therapy as well, she hasn't been comfortable with this yet. He is working on getting the house ready to sell so that he can move back down here. He has limited support with his brother's.     He is still quite anxious however. The only thing that he has found thus far to help him settle down is the lorazepam. He does find that he overthings, stresses out.     He does note that the Adderall is working well to help with his attention. He does make poor decisions without the Adderall.         Review of Systems   Per above      Objective    /80 (BP Location: Right arm, Patient Position: Sitting, Cuff Size: Adult Regular)   Pulse 75   Wt 68.9 kg (152 lb)   SpO2 100%   BMI 21.20 kg/m    Body mass index is 21.2 kg/m .  Physical Exam   GENERAL: healthy, alert and no distress  MS: no gross musculoskeletal defects noted, no edema  PSYCH: concentration poor, tearful, anxious, speech pressured, judgement and insight intact and appearance well groomed                Answers for HPI/ROS submitted by the patient on 12/7/2021  If you checked off any problems, how difficult have these problems  made it for you to do your work, take care of things at home, or get along with other people?: Very difficult  PHQ9 TOTAL SCORE: 6

## 2021-12-08 ASSESSMENT — PATIENT HEALTH QUESTIONNAIRE - PHQ9: SUM OF ALL RESPONSES TO PHQ QUESTIONS 1-9: 6

## 2021-12-21 DIAGNOSIS — G89.29 CHRONIC ABDOMINAL PAIN: ICD-10-CM

## 2021-12-21 DIAGNOSIS — R10.9 CHRONIC ABDOMINAL PAIN: ICD-10-CM

## 2021-12-25 NOTE — TELEPHONE ENCOUNTER
"Routing refill request to provider for review/approval because:  New medication for patient, PCP please review for refill.     Last Written Prescription Date:  10/19/2021  Last Fill Quantity: 30,  # refills: 1   Last office visit provider:  12/7/2021     Requested Prescriptions   Pending Prescriptions Disp Refills     omeprazole (PRILOSEC) 20 MG DR capsule [Pharmacy Med Name: OMEPRAZOLE 20MG CAPSULES] 30 capsule 1     Sig: TAKE 1 CAPSULE(20 MG) BY MOUTH DAILY       PPI Protocol Passed - 12/21/2021  5:04 PM        Passed - Not on Clopidogrel (unless Pantoprazole ordered)        Passed - No diagnosis of osteoporosis on record        Passed - Recent (12 mo) or future (30 days) visit within the authorizing provider's specialty     Patient has had an office visit with the authorizing provider or a provider within the authorizing providers department within the previous 12 mos or has a future within next 30 days. See \"Patient Info\" tab in inbasket, or \"Choose Columns\" in Meds & Orders section of the refill encounter.              Passed - Medication is active on med list        Passed - Patient is age 18 or older             Yaz Pavon RN 12/24/21 6:49 PM  "

## 2022-01-17 ENCOUNTER — TRANSFERRED RECORDS (OUTPATIENT)
Dept: HEALTH INFORMATION MANAGEMENT | Facility: CLINIC | Age: 37
End: 2022-01-17
Payer: COMMERCIAL

## 2022-01-17 LAB
CREATININE (EXTERNAL): 0.9 MG/DL (ref 0.57–1.11)
GFR ESTIMATED (EXTERNAL): >60 ML/MIN/1.73M2
GFR ESTIMATED (IF AFRICAN AMERICAN) (EXTERNAL): >60 ML/MIN/1.73M2
GLUCOSE (EXTERNAL): 100 MG/DL (ref 65–100)
POTASSIUM (EXTERNAL): 3.6 MMOL/L (ref 3.5–5)

## 2022-01-18 ENCOUNTER — VIRTUAL VISIT (OUTPATIENT)
Dept: FAMILY MEDICINE | Facility: CLINIC | Age: 37
End: 2022-01-18
Payer: COMMERCIAL

## 2022-01-18 DIAGNOSIS — F43.10 PTSD (POST-TRAUMATIC STRESS DISORDER): ICD-10-CM

## 2022-01-18 DIAGNOSIS — G89.29 CHRONIC ABDOMINAL PAIN: Primary | ICD-10-CM

## 2022-01-18 DIAGNOSIS — R10.9 CHRONIC ABDOMINAL PAIN: Primary | ICD-10-CM

## 2022-01-18 DIAGNOSIS — S00.432D HEMATOMA OF LEFT EAR, SUBSEQUENT ENCOUNTER: ICD-10-CM

## 2022-01-18 DIAGNOSIS — F90.9 ATTENTION DEFICIT HYPERACTIVITY DISORDER (ADHD), UNSPECIFIED ADHD TYPE: ICD-10-CM

## 2022-01-18 PROCEDURE — 99214 OFFICE O/P EST MOD 30 MIN: CPT | Mod: GT | Performed by: FAMILY MEDICINE

## 2022-01-18 RX ORDER — CIPROFLOXACIN 500 MG/1
500 TABLET, FILM COATED ORAL
COMMUNITY
Start: 2022-01-17 | End: 2022-01-24

## 2022-01-18 RX ORDER — OXYCODONE HYDROCHLORIDE 5 MG/1
5 TABLET ORAL EVERY 6 HOURS PRN
Qty: 12 TABLET | Refills: 0 | Status: SHIPPED | OUTPATIENT
Start: 2022-01-18 | End: 2022-01-21

## 2022-01-18 RX ORDER — DEXTROAMPHETAMINE SACCHARATE, AMPHETAMINE ASPARTATE MONOHYDRATE, DEXTROAMPHETAMINE SULFATE AND AMPHETAMINE SULFATE 7.5; 7.5; 7.5; 7.5 MG/1; MG/1; MG/1; MG/1
30 CAPSULE, EXTENDED RELEASE ORAL 2 TIMES DAILY
Qty: 60 CAPSULE | Refills: 0 | Status: SHIPPED | OUTPATIENT
Start: 2022-01-18 | End: 2022-02-26

## 2022-01-18 RX ORDER — PREDNISONE 20 MG/1
20 TABLET ORAL
COMMUNITY
Start: 2022-01-17 | End: 2022-01-22

## 2022-01-18 RX ORDER — BUSPIRONE HYDROCHLORIDE 5 MG/1
5 TABLET ORAL 2 TIMES DAILY
Qty: 60 TABLET | Refills: 5 | Status: SHIPPED | OUTPATIENT
Start: 2022-01-18 | End: 2022-05-29

## 2022-01-18 RX ORDER — GABAPENTIN 300 MG/1
CAPSULE ORAL
Qty: 90 CAPSULE | Refills: 5 | Status: SHIPPED | OUTPATIENT
Start: 2022-01-18 | End: 2022-03-10

## 2022-01-18 ASSESSMENT — PATIENT HEALTH QUESTIONNAIRE - PHQ9: SUM OF ALL RESPONSES TO PHQ QUESTIONS 1-9: 4

## 2022-01-18 NOTE — PROGRESS NOTES
Sohan is a 36 year old who is being evaluated via a billable video visit.      How would you like to obtain your AVS? MyChart  If the video visit is dropped, the invitation should be resent by: Send to e-mail at: [email or web address]  Will anyone else be joining your video visit? No      Video Start Time: 4:34 PM    Assessment & Plan     Chronic abdominal pain  -Stable at this time with his medications. He will continue on this and follow up with me in 3 months.   - gabapentin (NEURONTIN) 300 MG capsule  Dispense: 90 capsule; Refill: 5  - omeprazole (PRILOSEC) 20 MG DR capsule  Dispense: 30 capsule; Refill: 5    PTSD (post-traumatic stress disorder)  -continuing to work with his therapist, symptosm are stable but he is still struggling due to his relationship issues with his wife. Will continue to work on therapy and f/u with me in 3 months if stable, sooner if worsening.   - busPIRone (BUSPAR) 5 MG tablet  Dispense: 60 tablet; Refill: 5    Attention deficit hyperactivity disorder (ADHD), unspecified ADHD type  -Stable at this time with current regimen.   -CSA updated 5/24/21, will need to update at next visit.   -UDS needs to be updated at next visit as well  - amphetamine-dextroamphetamine (ADDERALL XR) 30 MG 24 hr capsule  Dispense: 60 capsule; Refill: 0    Hematoma of left ear, subsequent encounter  -improving with pressure bandage, small prescription of oxycodone provided to use in addition to his scheduled tylenol and ibuprofen.   - oxyCODONE (ROXICODONE) 5 MG tablet  Dispense: 12 tablet; Refill: 0             No follow-ups on file.    Lilly Catalan MD  [facility]   Sohan is a 36 year old who presents for the following health issues     HPI     He is following up today for a medication check.     He is not doing well as he and his partner are still struggling. He has slowed down at work, was finishing on time at work. This has been stressful. He is working on getting  unemployment and he is going to work with his sister on this. He is behind on things which is frustrating. This has been hard on him.     He is doing therapy weekly.     He does have severe pain in his left ear due to the drainage.       Review of Systems   Per above      Objective           Vitals:  No vitals were obtained today due to virtual visit.    Physical Exam   GENERAL: Healthy, alert and no distress  EYES: Eyes grossly normal to inspection.  No discharge or erythema, or obvious scleral/conjunctival abnormalities.  HENT: normal cephalic/atraumatic and pressure bandage evident on his head and covering the left ear  RESP: No audible wheeze, cough, or visible cyanosis.  No visible retractions or increased work of breathing.    SKIN: Visible skin clear. No significant rash, abnormal pigmentation or lesions.  NEURO: Cranial nerves grossly intact.  Mentation and speech appropriate for age.  PSYCH: mentation appears normal, anxious, judgement and insight intact, appearance well groomed and frustrated                Video-Visit Details    Type of service:  Video Visit    Video End Time:4:58 PM    Originating Location (pt. Location): Home    Distant Location (provider location):  [facility]     Platform used for Video Visit: Daphney

## 2022-01-28 ENCOUNTER — HOSPITAL ENCOUNTER (EMERGENCY)
Facility: CLINIC | Age: 37
Discharge: HOME | End: 2022-01-28
Attending: EMERGENCY MEDICINE | Admitting: EMERGENCY MEDICINE
Payer: COMMERCIAL

## 2022-01-28 VITALS
BODY MASS INDEX: 21.62 KG/M2 | OXYGEN SATURATION: 100 % | WEIGHT: 155 LBS | HEART RATE: 56 BPM | SYSTOLIC BLOOD PRESSURE: 142 MMHG | DIASTOLIC BLOOD PRESSURE: 102 MMHG | RESPIRATION RATE: 18 BRPM | TEMPERATURE: 98.3 F

## 2022-01-28 DIAGNOSIS — H60.02 ABSCESS OF LEFT EXTERNAL EAR: ICD-10-CM

## 2022-01-28 PROCEDURE — 99282 EMERGENCY DEPT VISIT SF MDM: CPT

## 2022-01-28 ASSESSMENT — ENCOUNTER SYMPTOMS
DIAPHORESIS: 0
CHILLS: 0
FEVER: 0

## 2022-01-28 NOTE — ED TRIAGE NOTES
Patient here with swelling to L ear, has had to have it drained x2 in the past few weeks.  Janel Hussein RN.......1/28/2022 8:12 AM

## 2022-01-28 NOTE — DISCHARGE INSTRUCTIONS
Please follow-up with Dr. Xiao, [facility], today at 2:20 pm   [facility] [employer] [place] [address]

## 2022-02-23 ENCOUNTER — TELEPHONE (OUTPATIENT)
Dept: FAMILY MEDICINE | Facility: CLINIC | Age: 37
End: 2022-02-23
Payer: COMMERCIAL

## 2022-02-23 NOTE — TELEPHONE ENCOUNTER
Patient came into clinic and needed a signed copy of his med list faxed over to new possible place of employment. Printed, signed by Dr. Catalan, and faxed over. Copy made and put I mijares basket.    Jackie Nickerson, CMA

## 2022-02-26 ENCOUNTER — MYC REFILL (OUTPATIENT)
Dept: FAMILY MEDICINE | Facility: CLINIC | Age: 37
End: 2022-02-26
Payer: COMMERCIAL

## 2022-02-26 DIAGNOSIS — F90.9 ATTENTION DEFICIT HYPERACTIVITY DISORDER (ADHD), UNSPECIFIED ADHD TYPE: ICD-10-CM

## 2022-03-01 NOTE — TELEPHONE ENCOUNTER
Routing refill request to provider for review/approval because:  Controlled substance request    Last Written Prescription Date:  1/18/22  Last Fill Quantity: 60,  # refills: 0   Last office visit provider:  1/18/22     Requested Prescriptions   Pending Prescriptions Disp Refills     amphetamine-dextroamphetamine (ADDERALL XR) 30 MG 24 hr capsule 60 capsule 0     Sig: Take 1 capsule (30 mg) by mouth 2 times daily       There is no refill protocol information for this order          Lui Chávez RN 03/01/22 10:38 AM

## 2022-03-02 RX ORDER — DEXTROAMPHETAMINE SACCHARATE, AMPHETAMINE ASPARTATE MONOHYDRATE, DEXTROAMPHETAMINE SULFATE AND AMPHETAMINE SULFATE 7.5; 7.5; 7.5; 7.5 MG/1; MG/1; MG/1; MG/1
30 CAPSULE, EXTENDED RELEASE ORAL 2 TIMES DAILY
Qty: 60 CAPSULE | Refills: 0 | Status: SHIPPED | OUTPATIENT
Start: 2022-03-02 | End: 2022-03-28

## 2022-03-10 DIAGNOSIS — G89.29 CHRONIC ABDOMINAL PAIN: ICD-10-CM

## 2022-03-10 DIAGNOSIS — R10.9 CHRONIC ABDOMINAL PAIN: ICD-10-CM

## 2022-03-10 RX ORDER — GABAPENTIN 300 MG/1
CAPSULE ORAL
Qty: 90 CAPSULE | Refills: 3 | Status: SHIPPED | OUTPATIENT
Start: 2022-03-10 | End: 2022-06-01

## 2022-03-16 DIAGNOSIS — G89.29 CHRONIC ABDOMINAL PAIN: ICD-10-CM

## 2022-03-16 DIAGNOSIS — R10.9 CHRONIC ABDOMINAL PAIN: ICD-10-CM

## 2022-03-16 NOTE — TELEPHONE ENCOUNTER
"Routing refill request to provider for review/approval because:  Is patient still on this medication.  Not taking as prescribed.    Last Written Prescription Date:  10/19/2021  Last Fill Quantity: 60,  # refills: 1   Last office visit provider:  01/18/2022     Requested Prescriptions   Pending Prescriptions Disp Refills     dicyclomine (BENTYL) 10 MG capsule [Pharmacy Med Name: DICYCLOMINE 10MG CAPSULES] 60 capsule 1     Sig: TAKE 1 TO 2 CAPSULES(10 TO 20 MG) BY MOUTH FOUR TIMES DAILY BEFORE MEALS AND AT NIGHT       Oral Anticholinergic Agents Passed - 3/16/2022 10:06 AM        Passed - Patient is of age 12 or older        Passed - Recent (12 mo) or future (30 days) visit with authorizing provider's specialty     Patient has had an office visit with the authorizing provider or a provider within the authorizing providers department within the previous 12 mos or has a future within next 30 days. See \"Patient Info\" tab in inbasket, or \"Choose Columns\" in Meds & Orders section of the refill encounter.              Passed - Medication is active on med list             Marquita Cárdenas 03/16/22 12:40 PM  "

## 2022-03-17 RX ORDER — DICYCLOMINE HYDROCHLORIDE 10 MG/1
CAPSULE ORAL
Qty: 60 CAPSULE | Refills: 1 | Status: SHIPPED | OUTPATIENT
Start: 2022-03-17 | End: 2022-11-03

## 2022-03-28 ENCOUNTER — MYC REFILL (OUTPATIENT)
Dept: FAMILY MEDICINE | Facility: CLINIC | Age: 37
End: 2022-03-28
Payer: COMMERCIAL

## 2022-03-28 DIAGNOSIS — F90.9 ATTENTION DEFICIT HYPERACTIVITY DISORDER (ADHD), UNSPECIFIED ADHD TYPE: ICD-10-CM

## 2022-03-30 NOTE — TELEPHONE ENCOUNTER
Medication: Adderall XR 30mg  Last Date Filled 3/2/22  Last appointment addressing medication use: 1/18/22    CSA in last year: YES    Random Utox in last year: NO  (if any of the above answer NO - schedule with PCP)     Opioids + benzodiazepines? YES  (if the above answer YES - schedule with PCP every 6 months)       All responses suggest:

## 2022-03-30 NOTE — TELEPHONE ENCOUNTER
Routing refill request to provider for review/approval because:  Drug not on the [facility] refill protocol Control Substance    Last Written Prescription Date:  030222  Last Fill Quantity: 60,  # refills: 0   Last office visit provider:  1/18/22     Requested Prescriptions   Pending Prescriptions Disp Refills     amphetamine-dextroamphetamine (ADDERALL XR) 30 MG 24 hr capsule 60 capsule 0     Sig: Take 1 capsule (30 mg) by mouth 2 times daily       There is no refill protocol information for this order          Sita Steiner LPN 03/30/22 11:28 AM

## 2022-03-31 RX ORDER — DEXTROAMPHETAMINE SACCHARATE, AMPHETAMINE ASPARTATE MONOHYDRATE, DEXTROAMPHETAMINE SULFATE AND AMPHETAMINE SULFATE 7.5; 7.5; 7.5; 7.5 MG/1; MG/1; MG/1; MG/1
30 CAPSULE, EXTENDED RELEASE ORAL 2 TIMES DAILY
Qty: 60 CAPSULE | Refills: 0 | Status: SHIPPED | OUTPATIENT
Start: 2022-03-31 | End: 2022-05-02

## 2022-04-15 ENCOUNTER — TELEPHONE (OUTPATIENT)
Dept: FAMILY MEDICINE | Facility: CLINIC | Age: 37
End: 2022-04-15
Payer: COMMERCIAL

## 2022-04-15 NOTE — TELEPHONE ENCOUNTER
Reason for Call:  Lupis would like to speak with  about this patient prior to his next appointment  05/2022.  Regarding mood changes, mental health.    Detailed comments: please call Lupis back, she is available today, 8-2:45, ok to leave message for a time to call back.    Phone Number Patient can be reached at: Other phone number:  [phone]    Best Time: any    Can we leave a detailed message on this number? YES    Call taken on 4/15/2022 at 9:11 AM by Lynsey Kimbrough

## 2022-04-18 NOTE — TELEPHONE ENCOUNTER
Left message with provider to call back and let me know what time works well for her, otherwise will call back later today

## 2022-05-02 ENCOUNTER — MYC REFILL (OUTPATIENT)
Dept: FAMILY MEDICINE | Facility: CLINIC | Age: 37
End: 2022-05-02
Payer: COMMERCIAL

## 2022-05-02 DIAGNOSIS — F90.9 ATTENTION DEFICIT HYPERACTIVITY DISORDER (ADHD), UNSPECIFIED ADHD TYPE: ICD-10-CM

## 2022-05-05 NOTE — TELEPHONE ENCOUNTER
Medication: Adderall XR 30 mg  Last Date Filled 03/31/2022  Last appointment addressing medication use: 12/07/2021      Taken as prescribed from physician notes? YES    CSA in last year: YES (05/24/2021)    Random Utox in last year: NO  (if any of the above answer NO - schedule with PCP)     Opioids + benzodiazepines? NA  (if the above answer YES - schedule with PCP every 6 months)       All responses suggest: Scheduling with PCP for further intervention

## 2022-05-05 NOTE — TELEPHONE ENCOUNTER
Routing refill request to provider for review/approval because:  Controlled substance request    Last Written Prescription Date:  3/31/22  Last Fill Quantity: 60,  # refills: 0   Last office visit provider:  12/7/21     Requested Prescriptions   Pending Prescriptions Disp Refills     amphetamine-dextroamphetamine (ADDERALL XR) 30 MG 24 hr capsule 60 capsule 0     Sig: Take 1 capsule (30 mg) by mouth 2 times daily       There is no refill protocol information for this order          Lui Chávez RN 05/05/22 12:10 PM

## 2022-05-06 RX ORDER — DEXTROAMPHETAMINE SACCHARATE, AMPHETAMINE ASPARTATE MONOHYDRATE, DEXTROAMPHETAMINE SULFATE AND AMPHETAMINE SULFATE 7.5; 7.5; 7.5; 7.5 MG/1; MG/1; MG/1; MG/1
30 CAPSULE, EXTENDED RELEASE ORAL 2 TIMES DAILY
Qty: 60 CAPSULE | Refills: 0 | Status: SHIPPED | OUTPATIENT
Start: 2022-05-06 | End: 2022-06-01

## 2022-05-28 DIAGNOSIS — F43.10 PTSD (POST-TRAUMATIC STRESS DISORDER): ICD-10-CM

## 2022-05-29 RX ORDER — BUSPIRONE HYDROCHLORIDE 5 MG/1
TABLET ORAL
Qty: 60 TABLET | Refills: 5 | Status: SHIPPED | OUTPATIENT
Start: 2022-05-29 | End: 2022-06-01

## 2022-05-30 NOTE — TELEPHONE ENCOUNTER
"Last Written Prescription Date:  1/18/22  Last Fill Quantity: 60,  # refills: 5   Last office visit provider:  1/18/22     Requested Prescriptions   Pending Prescriptions Disp Refills     busPIRone (BUSPAR) 5 MG tablet [Pharmacy Med Name: BUSPIRONE 5MG TABLETS] 60 tablet 5     Sig: TAKE 1 TABLET(5 MG) BY MOUTH TWICE DAILY       Atypical Antidepressants Protocol Passed - 5/28/2022  9:45 AM        Passed - Recent (12 mo) or future (30 days) visit within the authorizing provider's specialty     Patient has had an office visit with the authorizing provider or a provider within the authorizing providers department within the previous 12 mos or has a future within next 30 days. See \"Patient Info\" tab in inbasket, or \"Choose Columns\" in Meds & Orders section of the refill encounter.              Passed - Medication active on med list        Passed - Patient is age 18 or older             Rafia Torres RN 05/29/22 7:09 PM  "

## 2022-05-31 NOTE — TELEPHONE ENCOUNTER
Lupis calling back as her day has ended early if we want to touch base with her yet today she will be available until shortly after 430p today,

## 2022-05-31 NOTE — TELEPHONE ENCOUNTER
Lupis calling back, states she did not get previous message but would like to speak to Dr Catalan prior to pts appt tomorrow.     She would be available after 430p or during day always around the 25 or 30 kaila of the the hour(i.e.- like 225p or 230p)

## 2022-06-01 ENCOUNTER — OFFICE VISIT (OUTPATIENT)
Dept: FAMILY MEDICINE | Facility: CLINIC | Age: 37
End: 2022-06-01
Payer: COMMERCIAL

## 2022-06-01 VITALS
OXYGEN SATURATION: 98 % | DIASTOLIC BLOOD PRESSURE: 78 MMHG | WEIGHT: 156 LBS | BODY MASS INDEX: 21.76 KG/M2 | RESPIRATION RATE: 16 BRPM | HEART RATE: 69 BPM | SYSTOLIC BLOOD PRESSURE: 116 MMHG

## 2022-06-01 DIAGNOSIS — R10.9 CHRONIC ABDOMINAL PAIN: ICD-10-CM

## 2022-06-01 DIAGNOSIS — G89.29 CHRONIC ABDOMINAL PAIN: ICD-10-CM

## 2022-06-01 DIAGNOSIS — F90.9 ATTENTION DEFICIT HYPERACTIVITY DISORDER (ADHD), UNSPECIFIED ADHD TYPE: ICD-10-CM

## 2022-06-01 DIAGNOSIS — F43.10 PTSD (POST-TRAUMATIC STRESS DISORDER): ICD-10-CM

## 2022-06-01 PROCEDURE — 99214 OFFICE O/P EST MOD 30 MIN: CPT | Performed by: FAMILY MEDICINE

## 2022-06-01 RX ORDER — DEXTROAMPHETAMINE SACCHARATE, AMPHETAMINE ASPARTATE MONOHYDRATE, DEXTROAMPHETAMINE SULFATE AND AMPHETAMINE SULFATE 7.5; 7.5; 7.5; 7.5 MG/1; MG/1; MG/1; MG/1
30 CAPSULE, EXTENDED RELEASE ORAL 2 TIMES DAILY
Qty: 60 CAPSULE | Refills: 0 | Status: SHIPPED | OUTPATIENT
Start: 2022-06-03 | End: 2022-07-06

## 2022-06-01 RX ORDER — GABAPENTIN 300 MG/1
600 CAPSULE ORAL 2 TIMES DAILY
Qty: 120 CAPSULE | Refills: 3 | Status: SHIPPED | OUTPATIENT
Start: 2022-06-01 | End: 2022-08-31

## 2022-06-01 RX ORDER — BUSPIRONE HYDROCHLORIDE 10 MG/1
10 TABLET ORAL 2 TIMES DAILY
Qty: 60 TABLET | Refills: 3 | Status: SHIPPED | OUTPATIENT
Start: 2022-06-01 | End: 2022-07-06

## 2022-06-01 ASSESSMENT — PAIN SCALES - GENERAL: PAINLEVEL: SEVERE PAIN (6)

## 2022-06-01 NOTE — TELEPHONE ENCOUNTER
10/15/2020    TELEHEALTH EVALUATION -- Audio/Visual (During LNONK-72 public health emergency)    Due to COVID 19 outbreak, patient's office visit was converted to a virtual visit. Patient was contacted and agreed to proceed with a virtual visit via Telephone Visit  The risks and benefits of converting to a virtual visit were discussed in light of the current infectious disease epidemic. Patient also understood that insurance coverage and co-pays are up to their individual insurance plans. HPI: Patient made aware of telephone visit patient was at home office    Minh Dow (:  1950) has requested an audio/video evaluation for the following concern(s):    Follow-up on diabetes hypothyroidism and adrenal insufficiency recent labs were reviewed A1c was 8.7 on July up from 7.2 in February TSH was also higher at 4+    Follow-up    For diabetes patient is on NPH insulin plus regular insulin sliding scale NPH 30 units at bedtime    Results for Oli Goldberg (MRN [id number]) as of 10/15/2020 12:01   Ref. Range 2020 08:53 2020 15:24 7/15/2020 10:32 10/12/2020 09:07   Hemoglobin A1C Latest Ref Range: 4.8 - 5.9 % 7.2 (H)  8.7 (H) 6.8 (H)       Results for Oli Goldberg (MRN [id number]) as of 10/15/2020 12:01   Ref.  Range 7/15/2020 10:32 10/12/2020 09:07   Sodium Latest Ref Range: 135 - 144 mEq/L 136 140   Potassium Latest Ref Range: 3.4 - 4.9 mEq/L 3.3 (L) 4.1   Chloride Latest Ref Range: 95 - 107 mEq/L 98 102   CO2 Latest Ref Range: 20 - 31 mEq/L 28 27   BUN Latest Ref Range: 8 - 23 mg/dL 13 10   Creatinine Latest Ref Range: 0.50 - 0.90 mg/dL 0.78 0.76   Anion Gap Latest Ref Range: 9 - 15 mEq/L 10 11   GFR Non- Latest Ref Range: >60  >60.0 >60.0   GFR African American Latest Ref Range: >60  >60.0 >60.0   Glucose Latest Ref Range: 70 - 99 mg/dL 191 (H) 103 (H)   Calcium Latest Ref Range: 8.5 - 9.9 mg/dL 9.4 10.1 (H)   Hemoglobin A1C Latest Ref Range: 4.8 - 5.9 % 8.7 (H) 6.8 (H) Lupis called back, just missed a call, states she can be available between 1220-1230pm today   TSH Latest Ref Range: 0.440 - 3.860 uIU/mL 3.500 4.730 (H)   T4 Free Latest Ref Range: 0.84 - 1.68 ng/dL 1.13 1.19     Patient Active Problem List   Diagnosis    Rotator cuff tendinitis    [facility]oid [facility]    Hypertension    CAD (coronary artery disease)    [facility] obesity [facility] to excess calories [facility]    Uncontrolled type 2 diabetes mellitus [facility]    Other specified hypothyroidism    Adrenal insufficiency (HCC)    Closed dislocation of left hip (HCC)    Hip dislocation, left, subsequent encounter    Displacement of internal left hip prosthesis ([facility]    Mobility impaired    Anterior dislocation of left hip (HCC)    Bilateral nonexudative age-related macular degeneration    Intracranial injury of other and unspecified nature, without mention of open intracranial wound, unspecified state of consciousness    Nuclear sclerosis    Hip dislocation, left, initial encounter ([facility]    Recurrent dislocation of left hip         Review of Systems    Prior to Visit Medications    Medication Sig Taking?  Authorizing Provider   gabapentin (NEURONTIN) 300 MG capsule TAKE ONE CAPSULE BY MOUTH THREE TIMES DAILY  Willy Menon MD   insulin NPH (NOVOLIN N) 100 UNIT/ML injection vial 30 units at bedtime  Herb Power MD   insulin regular (HUMULIN R) 100 UNIT/ML injection 10 units plus sliding scale   Less than 150 none   151-200 2 units   201-250 4 units   251-300 6 units   301-400 8 units  Herb Power MD   blood glucose test strips (ONETOUCH VERIO) strip USE 1 STRIP TO CHECK GLUCOSE 4 TIMES DAILY  Willy Menon MD   apixaban (ELIQUIS) 5 MG TABS tablet Take by mouth 2 times daily  Historical Provider, MD   levothyroxine (SYNTHROID) 75 MCG tablet Take 1 tablet by mouth Daily  Willy Menon MD   Insulin Syringe-Needle U-100 (AIMSCO INSULIN SYR ULTRA THIN) 31G X 5/16\" 0.3 ML MISC 1 each by Does not apply route 3 times daily  Herb Power MD   diltiazem (CARDIZEM CD) 300 MG extended release capsule Take 300 mg by minutes as needed for Chest pain up to max of 3 total doses. If no relief after 1 dose, call 911. Historical Provider, MD   clopidogrel (PLAVIX) 75 MG tablet Take 75 mg by mouth daily  Historical Provider, MD   Multiple Vitamins-Minerals (PRESERVISION AREDS 2) CAPS Take 1 capsule by mouth 2 times daily  Historical Provider, MD   aspirin 81 MG tablet Take 81 mg by mouth daily  Historical Provider, MD   cetirizine (ZYRTEC) 10 MG tablet Take 10 mg by mouth daily   Historical Provider, MD   cimetidine (TAGAMET HB) 200 MG tablet Take 200 mg by mouth 2 times daily  Historical Provider, MD   Lactobacillus (PROBIOTIC ACIDOPHILUS PO) Take by mouth daily  Historical Provider, MD   FOLIC ACID PO Take 937 mg by mouth 2 times daily  Historical Provider, MD   B Complex Vitamins (VITAMIN B COMPLEX PO) Take by mouth daily  Historical Provider, MD   Cholecalciferol (VITAMIN D3) 5000 UNITS TABS Take 1 tablet by mouth daily  Historical Provider, MD   albuterol sulfate HFA (VENTOLIN HFA) 108 (90 BASE) MCG/ACT inhaler Inhale 2 puffs into the lungs every 6 hours as needed for Wheezing  Historical Provider, MD   furosemide (LASIX) 40 MG tablet Take 40 mg by mouth daily as needed   Historical Provider, MD   methotrexate 2.5 MG tablet Take 2.5 mg by mouth once a week TAKE 8 TABS [address]  Historical Provider, MD       Social History     Tobacco Use    Smoking status: Former Smoker    Smokeless tobacco: Never Used    Tobacco comment: QUIT AT AGE 41   Substance Use Topics    Alcohol use: Not Currently    Drug use:  No            PHYSICAL EXAMINATION:  [ INSTRUCTIONS:  \"[x]\" Indicates a positive item  \"[]\" Indicates a negative item  -- DELETE ALL ITEMS NOT EXAMINED]  [] Alert  [] Oriented to person/place/time    [] No apparent distress  [] Toxic appearing    [] Face flushed appearing [] Sclera clear  [] Lips are cyanotic      [] Breathing appears normal  [] Appears tachypneic      [] Rash on visible skin    [] Cranial Nerves II-XII grossly intact    [] Motor grossly intact in visible upper extremities    [] Motor grossly intact in visible lower extremities    [] Normal Mood  [] Anxious appearing    [] Depressed appearing  [] Confused appearing      [] Poor short term memory  [] Poor long term memory    [] OTHER:      Due to this being a TeleHealth encounter, evaluation of the following organ systems is limited: Vitals/Constitutional/EENT/Resp/CV/GI//MS/Neuro/Skin/Heme-Lymph-Imm. ASSESSMENT/PLAN:     Diagnosis Orders   1. Uncontrolled type 2 diabetes mellitus with hypoglycemia and coma ([facility]  Basic Metabolic Panel    Hemoglobin A1C   2. Hypothyroidism, unspecified type  levothyroxine (SYNTHROID) 100 MCG tablet    T4, Free    TSH without Reflex     Orders Placed This Encounter   Procedures    Basic Metabolic Panel     Standing Status:   Future     Standing Expiration Date:   10/15/2021    T4, Free     Standing Status:   Future     Standing Expiration Date:   10/15/2021    TSH without Reflex     Standing Status:   Future     Standing Expiration Date:   10/15/2021    Hemoglobin A1C     Standing Status:   Future     Standing Expiration Date:   10/15/2021     Orders Placed This Encounter   Medications    levothyroxine (SYNTHROID) 100 MCG tablet     Sig: Take 1 tablet by mouth Daily     Dispense:  90 tablet     Refill:  3    predniSONE (DELTASONE) 10 MG tablet     Si 1/2 po daily     Dispense:  60 tablet     Refill:  01       Increase levothyroxine 200 mcg daily continue with prednisone 15 mg daily    Continue NPH 30 units at bedtime plus regular insulin plus sliding scale    Follow-up in 2 to 3 months time    Total time spent with patient was 15 minutes    An  electronic signature was used to authenticate this note.     --Kerry Garcia MD on 10/15/2020 at 12:01 PM        Pursuant to the emergency declaration under the [facility] waiver authority and the [place] Resources and Response Supplemental Appropriations Act, this Virtual  Visit was conducted, with patient's consent, to reduce the patient's risk of exposure to COVID-19 and provide continuity of care for an established patient. Services were provided through a video synchronous discussion virtually to substitute for in-person clinic visit.

## 2022-06-01 NOTE — LETTER
[facility] [place]  06/01/22  Patient: Aurelio Ames  YOB: 1985  Medical Record Number: [id number]                                                                                  Non-Opioid Controlled Substance Agreement    This is an agreement between you and your provider regarding safe and appropriate use of controlled substances prescribed by your care team. Controlled substances are?medicines that can cause physical and mental dependence (abuse).     There are strict laws about having and using these medicines. We here at [facility] are  committed to working with you in your efforts to get better. To support you in this work, we'll help you schedule regular office appointments for medicine refills. If we must cancel or change your appointment for any reason, we'll make sure you have enough medicine to last until your next appointment.     As a Provider, I will:     Listen carefully to your concerns while treating you with respect.     Recommend a treatment plan that I believe is in your best interest and may involve therapies other than medicine.      Talk with you often about the possible benefits and the risk of harm of any medicine that we prescribe for you.    Assess the safety of this medicine and check how well it works.      Provide a plan on how to taper (discontinue or go off) using this medicine if the decision is made to stop its use.      ::  As a Patient, I understand controlled substances:       Are prescribed by my care provider to help me function or work and manage my condition(s).?    Are strong medicines and can cause serious side effects.       Need to be taken exactly as prescribed.?Combining controlled substances with certain medicines or chemicals (such as illegal drugs, alcohol, sedatives, sleeping pills, and benzodiazepines) can be dangerous or even fatal.? If I stop taking my medicines suddenly, I may have severe withdrawal symptoms.     The  risks, benefits, and side effects of these medicine(s) were explained to me. I agree that:    1. I will take part in other treatments as advised by my care team. This may be psychiatry or counseling, physical therapy, behavioral therapy, group treatment or a referral to specialist.    2. I will keep all my appointments and understand this is part of the monitoring of controlled substances.?My care team may require an office visit for EVERY controlled substance refill. If I miss appointments or don t follow instructions, my care team may stop my medicine    3. I will take my medicines as prescribed. I will not change the dose or schedule unless my care team tells me to. There will be no refills if I run out early.      4. I may be asked to come to the clinic and complete a urine drug test or complete a pill count. If I don t give a urine sample or participate in a pill count, the care team may stop my medicine.    5. I will only receive controlled substance prescriptions from this clinic. If I am treated by another provider, I will tell them that I am taking controlled substances and that I have a treatment agreement with this provider. I will inform my [facility] care team within one business day if I am given a prescription for any controlled substance by another healthcare provider. My [facility] care team can contact other providers and pharmacists about my use of any medicines.    6. It is up to me to make sure that I don't run out of my medicines on weekends or holidays.?If my care team is willing to refill my prescription without a visit, I must request refills only during office hours. Refills may take up to 3 business days to process. I will use one pharmacy to fill all my controlled substance prescriptions. I will notify the clinic about any changes to my insurance or medicine availability.    7. I am responsible for my prescriptions. If the medicine/prescription is lost, stolen or destroyed,  it will not be replaced.?I also agree not to share controlled substance medicines with anyone.     8. I am aware I should not use any illegal or recreational drugs. I agree not to drink alcohol unless my care team says I can.     9. If I enroll in the [place] Medical Cannabis program, I will tell my care team before my next refill.    10. I will tell my care team right away if I become pregnant, have a new medical problem treated outside of my regular clinic, or have a change in my medicines.     11. I understand that this medicine can affect my thinking, judgment and reaction time.? Alcohol and drugs affect the brain and body, which can affect the safety of my driving. Being under the influence of alcohol or drugs can affect my decision-making, behaviors, personal safety and the safety of others. Driving while impaired (DWI) can occur if a person is driving, operating or in physical control of a car, motorcycle, boat, snowmobile, ATV, motorbike, off-road vehicle or any other motor vehicle ([place] Statute 169A.20). I understand the risk if I choose to drive or operate any vehicle or machinery.    I understand that if I do not follow any of the conditions above, my prescriptions or treatment may be stopped or changed.   I agree that my provider, clinic care team and pharmacy may work with any city, state or federal law enforcement agency that investigates the misuse, sale or other diversion of my controlled medicine. I will allow my provider to discuss my care with, or share a copy of, this agreement with any other treating provider, pharmacy or emergency room where I receive care.     I have read this agreement and have asked questions about anything I did not understand.    ________________________________________________________  Patient Signature - Aurelio Ames     ___________________                   Date     ________________________________________________________  Provider Signature - Lilly Catalan MD        ___________________                   Date     ________________________________________________________  Witness Signature (required if provider not present while patient signing)          ___________________                   Date

## 2022-06-01 NOTE — PROGRESS NOTES
Assessment & Plan     PTSD (post-traumatic stress disorder)  -Referral for psychiatry placed today. For now, will continue with medications as they are listed, will trial going up on the buspar as well to see if this will help. He will f/u here in about one month for his next check and continue to work with his therapist as well.   - busPIRone (BUSPAR) 10 MG tablet  Dispense: 60 tablet; Refill: 3  - Adult Mental Health  Referral    Attention deficit hyperactivity disorder (ADHD), unspecified ADHD type  -Stable on Adderall, updated CSA today  - amphetamine-dextroamphetamine (ADDERALL XR) 30 MG 24 hr capsule  Dispense: 60 capsule; Refill: 0    Chronic abdominal pain  -Continue on gabapentin, increasing dose to 600 mg bid and f/u as listed already.   - gabapentin (NEURONTIN) 300 MG capsule  Dispense: 120 capsule; Refill: 3                   No follow-ups on file.    Lilly Catalan MD  [facility] [place]ry is a 36 year old who presents for the following health issues     History of Present Illness       Reason for visit:  Med check    He eats 0-1 servings of fruits and vegetables daily.He consumes 5 sweetened beverage(s) daily.He exercises with enough effort to increase his heart rate 60 or more minutes per day.  He exercises with enough effort to increase his heart rate 4 days per week.   He is taking medications regularly.       He is continuing to have a lot of pain.He does have issues with his hands, they will give out and he will drop things all the time. He does get shooting pain from elbow to his shoulder on the left as well. He does have a pain over the left chest as well that won't go away.     He is still quite depressed all the time. He does continue to feel frustrated that he and his wife are .    When he doesn't take the buspirone he is quite sad. No matter how had he tries he is never going to get his family back. He did not get it this morning  as he was out.     Review of Systems   Per above      Objective    /78   Pulse 69   Resp 16   Wt 70.8 kg (156 lb)   SpO2 98%   BMI 21.76 kg/m    Body mass index is 21.76 kg/m .  Physical Exam   PSYCH: mentation appears normal, tangential, tearful, judgement and insight intact and appearance well groomed

## 2022-06-24 ENCOUNTER — TELEPHONE (OUTPATIENT)
Dept: FAMILY MEDICINE | Facility: CLINIC | Age: 37
End: 2022-06-24

## 2022-06-24 NOTE — TELEPHONE ENCOUNTER
Reason for Call:  Other BEHAVIORAL CONCERNS    Detailed comments:  Lupis calling back to discuss pt overall health and some behavioral concerns. Would like to speak to Dr Catalan prior to pt next appt 7/6/22    Lupis [phone]    Best Time: some time frames that would work better for her to try and make this work. Next Monday between 123-130p, Tues or Wed between 430p-5p.  She runs her sessions on the hour so is typically easire to get a hold of between 23 mins after the hour and 30mins.(i.e. 123-130p)        Can we leave a detailed message on this number? YES    Call taken on 6/24/2022 at 3:25 PM by Carlos Almodovar

## 2022-06-27 NOTE — TELEPHONE ENCOUNTER
Patient is demonstrating more lability with his therapist. He did have a recent suicide attempt as well with hospitalization.     Appears that mental health has reached out and hasn't gotten in touch with him yet, she will follow up on this with them. In addition to this, he does have an appointment with me on 7/6.

## 2022-06-29 ASSESSMENT — PATIENT HEALTH QUESTIONNAIRE - PHQ9
SUM OF ALL RESPONSES TO PHQ QUESTIONS 1-9: 8
10. IF YOU CHECKED OFF ANY PROBLEMS, HOW DIFFICULT HAVE THESE PROBLEMS MADE IT FOR YOU TO DO YOUR WORK, TAKE CARE OF THINGS AT HOME, OR GET ALONG WITH OTHER PEOPLE: SOMEWHAT DIFFICULT
SUM OF ALL RESPONSES TO PHQ QUESTIONS 1-9: 8

## 2022-06-29 ASSESSMENT — ANXIETY QUESTIONNAIRES
2. NOT BEING ABLE TO STOP OR CONTROL WORRYING: SEVERAL DAYS
5. BEING SO RESTLESS THAT IT IS HARD TO SIT STILL: NOT AT ALL
7. FEELING AFRAID AS IF SOMETHING AWFUL MIGHT HAPPEN: NOT AT ALL
4. TROUBLE RELAXING: SEVERAL DAYS
1. FEELING NERVOUS, ANXIOUS, OR ON EDGE: SEVERAL DAYS
GAD7 TOTAL SCORE: 5
6. BECOMING EASILY ANNOYED OR IRRITABLE: SEVERAL DAYS
GAD7 TOTAL SCORE: 5
GAD7 TOTAL SCORE: 5
7. FEELING AFRAID AS IF SOMETHING AWFUL MIGHT HAPPEN: NOT AT ALL
3. WORRYING TOO MUCH ABOUT DIFFERENT THINGS: SEVERAL DAYS
8. IF YOU CHECKED OFF ANY PROBLEMS, HOW DIFFICULT HAVE THESE MADE IT FOR YOU TO DO YOUR WORK, TAKE CARE OF THINGS AT HOME, OR GET ALONG WITH OTHER PEOPLE?: SOMEWHAT DIFFICULT

## 2022-07-06 ENCOUNTER — OFFICE VISIT (OUTPATIENT)
Dept: FAMILY MEDICINE | Facility: CLINIC | Age: 37
End: 2022-07-06
Payer: COMMERCIAL

## 2022-07-06 VITALS
HEIGHT: 71 IN | WEIGHT: 155 LBS | BODY MASS INDEX: 21.7 KG/M2 | DIASTOLIC BLOOD PRESSURE: 78 MMHG | OXYGEN SATURATION: 93 % | HEART RATE: 76 BPM | RESPIRATION RATE: 16 BRPM | SYSTOLIC BLOOD PRESSURE: 120 MMHG

## 2022-07-06 DIAGNOSIS — F43.10 PTSD (POST-TRAUMATIC STRESS DISORDER): Primary | ICD-10-CM

## 2022-07-06 DIAGNOSIS — F90.9 ATTENTION DEFICIT HYPERACTIVITY DISORDER (ADHD), UNSPECIFIED ADHD TYPE: ICD-10-CM

## 2022-07-06 PROCEDURE — 99213 OFFICE O/P EST LOW 20 MIN: CPT | Performed by: FAMILY MEDICINE

## 2022-07-06 RX ORDER — RISPERIDONE 0.5 MG/1
0.5 TABLET ORAL 3 TIMES DAILY PRN
Qty: 90 TABLET | Refills: 5 | Status: SHIPPED | OUTPATIENT
Start: 2022-07-06 | End: 2023-03-31

## 2022-07-06 RX ORDER — LISINOPRIL 10 MG/1
10 TABLET ORAL
COMMUNITY
Start: 2022-06-04 | End: 2022-07-06

## 2022-07-06 RX ORDER — BUSPIRONE HYDROCHLORIDE 10 MG/1
10 TABLET ORAL
COMMUNITY
End: 2022-07-06

## 2022-07-06 RX ORDER — DICYCLOMINE HYDROCHLORIDE 10 MG/1
10 CAPSULE ORAL
COMMUNITY
Start: 2022-03-17 | End: 2022-07-06

## 2022-07-06 RX ORDER — DEXTROAMPHETAMINE SACCHARATE, AMPHETAMINE ASPARTATE MONOHYDRATE, DEXTROAMPHETAMINE SULFATE AND AMPHETAMINE SULFATE 7.5; 7.5; 7.5; 7.5 MG/1; MG/1; MG/1; MG/1
30 CAPSULE, EXTENDED RELEASE ORAL 2 TIMES DAILY
Qty: 60 CAPSULE | Refills: 0 | Status: SHIPPED | OUTPATIENT
Start: 2022-07-06 | End: 2022-07-26

## 2022-07-06 RX ORDER — RISPERIDONE 1 MG/1
TABLET ORAL
COMMUNITY
Start: 2022-06-16 | End: 2022-07-06

## 2022-07-06 RX ORDER — RISPERIDONE 0.5 MG/1
TABLET ORAL
COMMUNITY
Start: 2022-06-29 | End: 2022-07-06

## 2022-07-06 RX ORDER — RISPERIDONE 1 MG/1
1 TABLET ORAL EVERY MORNING
Qty: 30 TABLET | Refills: 5 | Status: SHIPPED | OUTPATIENT
Start: 2022-07-06 | End: 2022-08-31

## 2022-07-06 RX ORDER — BUSPIRONE HYDROCHLORIDE 5 MG/1
TABLET ORAL
Qty: 45 TABLET | Refills: 1 | Status: SHIPPED | OUTPATIENT
Start: 2022-07-06 | End: 2022-07-26 | Stop reason: SINTOL

## 2022-07-06 ASSESSMENT — PAIN SCALES - GENERAL: PAINLEVEL: MODERATE PAIN (5)

## 2022-07-06 ASSESSMENT — PATIENT HEALTH QUESTIONNAIRE - PHQ9
SUM OF ALL RESPONSES TO PHQ QUESTIONS 1-9: 8
10. IF YOU CHECKED OFF ANY PROBLEMS, HOW DIFFICULT HAVE THESE PROBLEMS MADE IT FOR YOU TO DO YOUR WORK, TAKE CARE OF THINGS AT HOME, OR GET ALONG WITH OTHER PEOPLE: SOMEWHAT DIFFICULT

## 2022-07-06 ASSESSMENT — ANXIETY QUESTIONNAIRES: GAD7 TOTAL SCORE: 5

## 2022-07-06 NOTE — PATIENT INSTRUCTIONS
Please go down to 5 mg two times daily of the buspirone for two weeks, if you are still feeling well  (you are not feeling worse with emotions) then we will go to 5 mg once daily for two weeks and I'll see you on the 26th as scheduled.     If you do feel like your emotions are escalating when we are weaning down then let's keep you on the 5 mg two times daily for now while you keep working with your therapist.     We'll keep you on the Risperidone as well. I sent the refills in for you as well.    Keep working with your therapist as well.     Lui Rey MD  Psychiatry (Psychiatry and Neurology)  NPI: [id number]  [address] [facility] [address]     Phone: [phone]  Fax: [phone]  Scheduled for 7/7 at 4:20

## 2022-07-06 NOTE — PROGRESS NOTES
Assessment & Plan     PTSD (post-traumatic stress disorder)  -patient feeling that his moods became much more labile after starting the higher dose of buspirone. Will have him trial going back down to 5 mg two times daily for the next two weeks, if still doing well will try going down to once daily. IN the meantime, he will continue on the Risperidone as listed below as this does seem to be helping him even out.   - busPIRone (BUSPAR) 5 MG tablet  Dispense: 45 tablet; Refill: 1  - risperiDONE (RISPERDAL) 1 MG tablet  Dispense: 30 tablet; Refill: 5  - risperiDONE (RISPERDAL) 0.5 MG tablet  Dispense: 90 tablet; Refill: 5    Attention deficit hyperactivity disorder (ADHD), unspecified ADHD type  -Doing well on current dose of medication. Will continue on this for now.   - amphetamine-dextroamphetamine (ADDERALL XR) 30 MG 24 hr capsule  Dispense: 60 capsule; Refill: 0           Depression Screening Follow Up    PHQ 6/29/2022   PHQ-9 Total Score 8   Q9: Thoughts of better off dead/self-harm past 2 weeks Several days   F/U: Thoughts of suicide or self-harm No   F/U: Safety concerns No         C-SSRS (Brief Emblem) 7/11/2022   Within the last month, have you wished you were dead or wished you could go to sleep and not wake up? Yes   Within the last month, have you had any actual thoughts of killing yourself? Yes   Within the last month, have you been thinking about how you might do this? Yes   Within the last month, have you had these thoughts and had some intention of acting on them? Yes   Within the last month, have you started to work out or worked out the details of how to kill yourself with the intent to carry out this plan? Yes   Within the last month, have you ever done anything, started to do anything, or prepared to do anything to end your life? Yes, in past month         Follow Up       Follow Up Actions Taken  Referred patient back to mental health provider    Discussed the following ways the patient can  remain in a safe environment:  be around others      No follow-ups on file.    Lilly Catalan MD  [facility] [place] is a 36 year old, presenting for the following health issues:  Recheck Medication (New meds not liking giving pt brain fog. )      History of Present Illness       Mental Health Follow-up:  Patient presents to follow-up on Depression & Anxiety.Patient's depression since last visit has been:  Medium  The patient is having other symptoms associated with depression.  Patient's anxiety since last visit has been:  Medium  The patient is having other symptoms associated with anxiety.  Any significant life events: No  Patient is feeling anxious or having panic attacks.  Patient has no concerns about alcohol or drug use.    Reason for visit:  Follow up.    He eats 2-3 servings of fruits and vegetables daily.He consumes 3 sweetened beverage(s) daily.He exercises with enough effort to increase his heart rate 60 or more minutes per day.  He exercises with enough effort to increase his heart rate 5 days per week.   He is taking medications regularly.    Today's PHQ-9         PHQ-9 Total Score: 8    PHQ-9 Q9 Thoughts of better off dead/self-harm past 2 weeks :   Several days  Thoughts of suicide or self harm: (P) No  Self-harm Plan:     Self-harm Action:       Safety concerns for self or others: (P) No    How difficult have these problems made it for you to do your work, take care of things at home, or get along with other people: Somewhat difficult  Today's HALLIE-7 Score: 5         He feels like being on the increased dose of buspirone has been clouding his brain. He has been having more issues with concentration with the medication.     He did have a suicide attempt as well as having violent outbursts. He does find that the risperidone is helping him with concentration, he is able to pause and think rather than just reacting. He does feel like the Risperidone is helping  "with his social anxiety, he is able to leave the house with this medication.     He has not needed the lorazepam either right now, has been feeling ok right now. He has been using the Risperidone right now if he is feeling panicky.     Review of Systems   Per above      Objective    /78   Pulse 76   Resp 16   Ht 1.803 m (5' 11\")   Wt 70.3 kg (155 lb)   SpO2 93%   BMI 21.62 kg/m    Body mass index is 21.62 kg/m .  Physical Exam   GENERAL: healthy, alert and no distress  MS: no gross musculoskeletal defects noted, no edema  PSYCH: appearance well groomed, he does have somewhat tangential thought process, he is tearful at times, seems less labile than previous visits. Still easily frustrated.         .  ..  "

## 2022-07-11 ASSESSMENT — COLUMBIA-SUICIDE SEVERITY RATING SCALE - C-SSRS
6. HAVE YOU EVER DONE ANYTHING, STARTED TO DO ANYTHING, OR PREPARED TO DO ANYTHING TO END YOUR LIFE?: YES, IN PAST MONTH
3. IN THE PAST MONTH, HAVE YOU BEEN THINKING ABOUT HOW YOU MIGHT KILL YOURSELF?: YES
5. IN THE PAST MONTH, HAVE YOU STARTED TO WORK OUT OR WORKED OUT THE DETAILS OF HOW TO KILL YOURSELF? DO YOU INTEND TO CARRY OUT THIS PLAN?: YES
1. WITHIN THE PAST MONTH, HAVE YOU WISHED YOU WERE DEAD OR WISHED YOU COULD GO TO SLEEP AND NOT WAKE UP?: YES
2. IN THE PAST MONTH, HAVE YOU ACTUALLY HAD ANY THOUGHTS OF KILLING YOURSELF?: YES
5. IN THE PAST MONTH, HAVE YOU STARTED TO WORK OUT OR WORKED OUT THE DETAILS OF HOW TO KILL YOURSELF? DO YOU INTEND TO CARRY OUT THIS PLAN?: YES

## 2022-07-14 DIAGNOSIS — R10.9 CHRONIC ABDOMINAL PAIN: ICD-10-CM

## 2022-07-14 DIAGNOSIS — G89.29 CHRONIC ABDOMINAL PAIN: ICD-10-CM

## 2022-07-14 NOTE — TELEPHONE ENCOUNTER
"Last Written Prescription Date:  3/10/22  Last Fill Quantity: 30,  # refills: 3   Last office visit provider:  7/6/22     Requested Prescriptions   Pending Prescriptions Disp Refills     omeprazole (PRILOSEC) 20 MG DR capsule [Pharmacy Med Name: OMEPRAZOLE 20MG CAPSULES] 30 capsule 3     Sig: TAKE 1 CAPSULE(20 MG) BY MOUTH DAILY       PPI Protocol Passed - 7/14/2022 12:59 PM        Passed - Not on Clopidogrel (unless Pantoprazole ordered)        Passed - No diagnosis of osteoporosis on record        Passed - Recent (12 mo) or future (30 days) visit within the authorizing provider's specialty     Patient has had an office visit with the authorizing provider or a provider within the authorizing providers department within the previous 12 mos or has a future within next 30 days. See \"Patient Info\" tab in inbasket, or \"Choose Columns\" in Meds & Orders section of the refill encounter.              Passed - Medication is active on med list        Passed - Patient is age 18 or older             Lui Chávez RN 07/14/22 12:59 PM  "

## 2022-07-26 ENCOUNTER — OFFICE VISIT (OUTPATIENT)
Dept: FAMILY MEDICINE | Facility: CLINIC | Age: 37
End: 2022-07-26
Payer: COMMERCIAL

## 2022-07-26 VITALS
WEIGHT: 153 LBS | SYSTOLIC BLOOD PRESSURE: 110 MMHG | BODY MASS INDEX: 21.34 KG/M2 | OXYGEN SATURATION: 94 % | HEART RATE: 64 BPM | DIASTOLIC BLOOD PRESSURE: 72 MMHG

## 2022-07-26 DIAGNOSIS — Z79.899 ENCOUNTER FOR LONG-TERM (CURRENT) USE OF MEDICATIONS: ICD-10-CM

## 2022-07-26 DIAGNOSIS — F90.9 ATTENTION DEFICIT HYPERACTIVITY DISORDER (ADHD), UNSPECIFIED ADHD TYPE: Primary | ICD-10-CM

## 2022-07-26 DIAGNOSIS — G89.29 CHRONIC ABDOMINAL PAIN: ICD-10-CM

## 2022-07-26 DIAGNOSIS — R10.9 CHRONIC ABDOMINAL PAIN: ICD-10-CM

## 2022-07-26 DIAGNOSIS — F43.10 PTSD (POST-TRAUMATIC STRESS DISORDER): ICD-10-CM

## 2022-07-26 PROCEDURE — 99213 OFFICE O/P EST LOW 20 MIN: CPT | Performed by: FAMILY MEDICINE

## 2022-07-26 RX ORDER — DEXTROAMPHETAMINE SACCHARATE, AMPHETAMINE ASPARTATE MONOHYDRATE, DEXTROAMPHETAMINE SULFATE AND AMPHETAMINE SULFATE 7.5; 7.5; 7.5; 7.5 MG/1; MG/1; MG/1; MG/1
30 CAPSULE, EXTENDED RELEASE ORAL 2 TIMES DAILY
Qty: 60 CAPSULE | Refills: 0 | Status: SHIPPED | OUTPATIENT
Start: 2022-08-03 | End: 2022-08-31

## 2022-07-26 NOTE — PROGRESS NOTES
Assessment & Plan     Attention deficit hyperactivity disorder (ADHD), unspecified ADHD type  -Will continue on current dose of his Adderall as prescribed for now. He will f/u here in 3 months and sooner as needed. Should plan on repeating drug screen as well at his next visit.   Positive for oxycodone after suicide attempt, suspect related to medication received in the ER.     PTSD (post-traumatic stress disorder)  -Currently doing well with his therapist, scheduled Risperidone and as needed risperidone. Will continue on this for now and work with his therapist on his mood. F/u in 3 months.     Encounter for long-term (current) use of medications  Per above    Chronic abdominal pain  -Stable with gabapentin.                    No follow-ups on file.    Lilly Catalan MD  [facility] [place] Parry is a 36 year old, presenting for the following health issues:  Medication Update (Not taking Buspar anymore (off for 2 weeks) feels much better)      History of Present Illness       Reason for visit:  Medication check    He eats 0-1 servings of fruits and vegetables daily.He consumes 6 sweetened beverage(s) daily.He exercises with enough effort to increase his heart rate 60 or more minutes per day.  He exercises with enough effort to increase his heart rate 4 days per week.   He is taking medications regularly.     He is feeling better without the buspar. The brain fog is now gone, he is not having issues with feeling like he is frustrated when his wife is upset. He is with his family right now, [place] [facility].     His pain is better with the gabapentin. He does have to work a lot more to get out of this hole from the hospital visits. He does find that this is a reasonable place for him right now. He is always more sore in the morning.     He is taking 1 mg in the morning of the Risperidone and then 0.5 mg at night, he is only rarely using the extra 0.5 mg tablets during the  day. Very occasionally he will feel freaked out and worried and then will take a Risperidone and will feel better. Therapy is going well as well.     He is working on getting the THC out of his system. He is not using medication at this time. He did have a drug screen at [facility] in June.     Review of Systems   Per above      Objective    /72   Pulse 64   Wt 69.4 kg (153 lb)   SpO2 94%   BMI 21.34 kg/m    Body mass index is 21.34 kg/m .  Physical Exam   GENERAL: healthy, alert and no distress  NECK: no adenopathy, no asymmetry, masses, or scars and thyroid normal to palpation  MS: no gross musculoskeletal defects noted, no edema

## 2022-08-31 ENCOUNTER — OFFICE VISIT (OUTPATIENT)
Dept: FAMILY MEDICINE | Facility: CLINIC | Age: 37
End: 2022-08-31
Payer: COMMERCIAL

## 2022-08-31 VITALS
OXYGEN SATURATION: 97 % | HEART RATE: 74 BPM | BODY MASS INDEX: 22.04 KG/M2 | DIASTOLIC BLOOD PRESSURE: 60 MMHG | SYSTOLIC BLOOD PRESSURE: 100 MMHG | WEIGHT: 158 LBS

## 2022-08-31 DIAGNOSIS — R10.9 CHRONIC ABDOMINAL PAIN: ICD-10-CM

## 2022-08-31 DIAGNOSIS — I10 ESSENTIAL HYPERTENSION: ICD-10-CM

## 2022-08-31 DIAGNOSIS — Z00.00 ENCOUNTER FOR ROUTINE HISTORY AND PHYSICAL EXAMINATION OF ADULT: Primary | ICD-10-CM

## 2022-08-31 DIAGNOSIS — G89.29 CHRONIC ABDOMINAL PAIN: ICD-10-CM

## 2022-08-31 DIAGNOSIS — F43.10 PTSD (POST-TRAUMATIC STRESS DISORDER): ICD-10-CM

## 2022-08-31 DIAGNOSIS — F90.9 ATTENTION DEFICIT HYPERACTIVITY DISORDER (ADHD), UNSPECIFIED ADHD TYPE: ICD-10-CM

## 2022-08-31 PROCEDURE — 99395 PREV VISIT EST AGE 18-39: CPT | Performed by: FAMILY MEDICINE

## 2022-08-31 RX ORDER — RISPERIDONE 1 MG/1
1 TABLET ORAL 2 TIMES DAILY
Qty: 60 TABLET | Refills: 5 | Status: SHIPPED | OUTPATIENT
Start: 2022-08-31 | End: 2023-03-31

## 2022-08-31 RX ORDER — LISINOPRIL 10 MG/1
10 TABLET ORAL DAILY
Qty: 90 TABLET | Refills: 3 | Status: SHIPPED | OUTPATIENT
Start: 2022-08-31 | End: 2022-09-21 | Stop reason: ALTCHOICE

## 2022-08-31 RX ORDER — DEXTROAMPHETAMINE SACCHARATE, AMPHETAMINE ASPARTATE MONOHYDRATE, DEXTROAMPHETAMINE SULFATE AND AMPHETAMINE SULFATE 7.5; 7.5; 7.5; 7.5 MG/1; MG/1; MG/1; MG/1
30 CAPSULE, EXTENDED RELEASE ORAL 2 TIMES DAILY
Qty: 60 CAPSULE | Refills: 0 | Status: SHIPPED | OUTPATIENT
Start: 2022-08-31 | End: 2022-10-03

## 2022-08-31 RX ORDER — GABAPENTIN 300 MG/1
CAPSULE ORAL
Qty: 450 CAPSULE | Refills: 3 | Status: SHIPPED | OUTPATIENT
Start: 2022-08-31 | End: 2023-07-11

## 2022-08-31 ASSESSMENT — ENCOUNTER SYMPTOMS
HEMATOCHEZIA: 0
PARESTHESIAS: 0
NERVOUS/ANXIOUS: 0
NAUSEA: 0
SORE THROAT: 0
ABDOMINAL PAIN: 0
HEMATURIA: 0
MYALGIAS: 0
PALPITATIONS: 0
CHILLS: 0
EYE PAIN: 0
DIZZINESS: 0
HEARTBURN: 0
FEVER: 0
COUGH: 0
FREQUENCY: 0
HEADACHES: 0
DYSURIA: 0
JOINT SWELLING: 0
SHORTNESS OF BREATH: 0
CONSTIPATION: 0
DIARRHEA: 0
WEAKNESS: 0
ARTHRALGIAS: 0

## 2022-08-31 NOTE — PROGRESS NOTES
SUBJECTIVE:   CC: Aurelio Ames is an 36 year old male who presents for preventative health visit.     Healthy Habits:     Getting at least 3 servings of Calcium per day:  Yes    Bi-annual eye exam:  Yes    Dental care twice a year:  Yes    Sleep apnea or symptoms of sleep apnea:  None    Diet:  Regular (no restrictions)    Frequency of exercise:  None    Taking medications regularly:  Yes    Medication side effects:  None    PHQ-2 Total Score: 2    Additional concerns today:  No    He is doing well generally. He does feel that things are better now that he is fully off of the buspar.     He is commonly taking 2-3 of the 0.5 mg tablets of risperidone daily. This is helping manage his emotions in a good place according to other people. He does think that we are heading in the right direction. He is often fluctating between tearfulness and anger as well as hopelessness like nothing is going to get better. He is still seeing his therapist on Tuesdays weekly still. He continues to feel frustrated that he is so sad and hopeless all the time. He continues to feel that he does not have his diagnosis correct either.     He is doing well with his medications for BP and heart burn. He does at times need to take a second omeprazole, this is working well for him.    His adderall dosing is working well.     He does think that the dicyclomine is working for his stomach.     He is having severe leg pains again and is wondering if he need to have his gabapentin dose increased. He does think that the pain would be much worse if he wasn't on it.       Today's PHQ-2 Score:   PHQ-2 ( 1999 Pf[employer]) 8/31/2022   Q1: Little interest or pleasure in doing things 1   Q2: Feeling down, depressed or hopeless 1   PHQ-2 Score 2   PHQ-2 Total Score (12-17 Years)- Positive if 3 or more points; Administer PHQ-A if positive -   Q1: Little interest or pleasure in doing things Several days   Q2: Feeling down, depressed or hopeless Several days    PHQ-2 Score 2       Abuse: Current or Past(Physical, Sexual or Emotional)- No  Do you feel safe in your environment? Yes    Have you ever done Advance Care Planning? (For example, a Health Directive, POLST, or a discussion with a medical provider or your loved ones about your wishes): No, advance care planning information given to patient to review.  Patient declined advance care planning discussion at this time.    Social History     Tobacco Use     Smoking status: Former Smoker     Packs/day: 1.50     Types: Vaping Device     Smokeless tobacco: Current User     Tobacco comment: Uses vape pen   Substance Use Topics     Alcohol use: Yes     Comment: Alcoholic Drinks/day: Quit 6 years ago. Might have a beer once in a while.      Alcohol Use 8/31/2022   Prescreen: >3 drinks/day or >7 drinks/week? No     Last PSA: No results found for: PSA    Reviewed orders with patient. Reviewed health maintenance and updated orders accordingly - Yes      Reviewed and updated as needed this visit by clinical staff   Tobacco  Allergies  Meds  Problems  Med Hx  Surg Hx  Fam Hx  Soc   Hx          Reviewed and updated as needed this visit by Provider   Tobacco  Allergies  Meds  Problems  Med Hx  Surg Hx  Fam Hx             Review of Systems   Constitutional: Negative for chills and fever.   HENT: Negative for congestion, ear pain, hearing loss and sore throat.    Eyes: Negative for pain and visual disturbance.   Respiratory: Negative for cough and shortness of breath.    Cardiovascular: Negative for chest pain, palpitations and peripheral edema.   Gastrointestinal: Negative for abdominal pain, constipation, diarrhea, heartburn, hematochezia and nausea.   Genitourinary: Negative for dysuria, frequency, genital sores, hematuria, impotence, penile discharge and urgency.   Musculoskeletal: Negative for arthralgias, joint swelling and myalgias.   Skin: Negative for rash.   Neurological: Negative for dizziness, weakness,  headaches and paresthesias.   Psychiatric/Behavioral: Negative for mood changes. The patient is not nervous/anxious.          OBJECTIVE:   /60   Pulse 74   Wt 71.7 kg (158 lb)   SpO2 97%   BMI 22.04 kg/m      Physical Exam  GENERAL: healthy, alert and no distress  EYES: Eyes grossly normal to inspection, PERRL and conjunctivae and sclerae normal  HENT: ear canals and TM's normal, nose and mouth without ulcers or lesions  NECK: no adenopathy, no asymmetry, masses, or scars and thyroid normal to palpation  RESP: lungs clear to auscultation - no rales, rhonchi or wheezes  CV: regular rate and rhythm, normal S1 S2, no S3 or S4, no murmur, click or rub, no peripheral edema and peripheral pulses strong  ABDOMEN: soft, tender mainly in the left mid abdomen between 2 large scars, no hepatosplenomegaly, no masses and bowel sounds normal  MS: no gross musculoskeletal defects noted, no edema  SKIN: no suspicious lesions or rashes  NEURO: Normal strength and tone, mentation intact and speech normal  PSYCH: mentation appears normal, affect normal/bright, tearful at times        ASSESSMENT/PLAN:   Aurelio was seen today for physical.    Diagnoses and all orders for this visit:    Encounter for routine history and physical examination of adult   Routine health maintenance discussion:  No smoking, limited alcohol (7 or less servings per week), 5 fruits/veg servings per day, 200 minutes of exercise per week.  Daily calcium/vitamin D guidelines, bone health, colon cancer screening beginning at age 50.  Accident avoidance, sun screen.    Essential hypertension  -     lisinopril (ZESTRIL) 10 MG tablet; Take 1 tablet (10 mg) by mouth daily  - BP is at goal today, he will continue on current medications and follow-up in 6 months if doing well.     Chronic abdominal pain  -     gabapentin (NEURONTIN) 300 MG capsule; Take 2 capsules (600 mg) by mouth 2 times daily AND 1 capsule (300 mg) daily (with lunch).  -     omeprazole  "(PRILOSEC) 20 MG DR capsule; Take 1 capsule (20 mg) by mouth 2 times daily as needed  - Will continue on dicyclomine as needed, increase dose middday of gabapentin as listed and continue on Gabapentin. If doing well he will f/u in six months, if the dose of gabapentin is not enough he will reach out sooner.     Attention deficit hyperactivity disorder (ADHD), unspecified ADHD type  -     amphetamine-dextroamphetamine (ADDERALL XR) 30 MG 24 hr capsule; Take 1 capsule (30 mg) by mouth 2 times daily  - Feels medication is at a good dose, will continue on this.     PTSD (post-traumatic stress disorder)  -     risperiDONE (RISPERDAL) 1 MG tablet; Take 1 tablet (1 mg) by mouth 2 times daily  - Given continued feelings of labile mood will have him increase to risperidone 1 mg two times daily with 0.5 mg three times daily as needed. He will f/u here in 3 months if he has not been seen in the psychiatry stabilization clinic prior to that.         Patient has been advised of split billing requirements and indicates understanding: Yes    COUNSELING:   Reviewed preventive health counseling, as reflected in patient instructions       Regular exercise       Healthy diet/nutrition       Advance Care Planning    Estimated body mass index is 22.04 kg/m  as calculated from the following:    Height as of 7/6/22: 1.803 m (5' 11\").    Weight as of this encounter: 71.7 kg (158 lb).         He reports that he has quit smoking. His smoking use included vaping device. He smoked 1.50 packs per day. He uses smokeless tobacco.      Counseling Resources:  ATP IV Guidelines  Pooled Cohorts Equation Calculator  FRAX Risk Assessment  ICSI Preventive Guidelines  Dietary Guidelines for Americans, 2010  USDA's MyPlate  ASA Prophylaxis  Lung CA Screening    Lilly Catalan MD  [facility]  "

## 2022-09-01 PROBLEM — F98.8 ADD (ATTENTION DEFICIT DISORDER): Status: ACTIVE | Noted: 2022-06-12

## 2022-09-01 PROBLEM — F39 EPISODIC MOOD DISORDER (H): Status: ACTIVE | Noted: 2022-06-16

## 2022-09-01 PROBLEM — F43.10 PTSD (POST-TRAUMATIC STRESS DISORDER): Status: ACTIVE | Noted: 2022-06-12

## 2022-09-01 PROBLEM — R10.9 CHRONIC ABDOMINAL PAIN: Status: ACTIVE | Noted: 2022-06-12

## 2022-09-01 PROBLEM — T14.91XA SUICIDE ATTEMPT (H): Status: ACTIVE | Noted: 2022-06-12

## 2022-09-01 PROBLEM — F12.90 MARIJUANA USE: Status: ACTIVE | Noted: 2021-09-21

## 2022-09-01 PROBLEM — G89.29 CHRONIC ABDOMINAL PAIN: Status: ACTIVE | Noted: 2022-06-12

## 2022-09-21 ENCOUNTER — VIRTUAL VISIT (OUTPATIENT)
Dept: BEHAVIORAL HEALTH | Facility: CLINIC | Age: 37
End: 2022-09-21
Payer: COMMERCIAL

## 2022-09-21 ENCOUNTER — VIRTUAL VISIT (OUTPATIENT)
Dept: PSYCHIATRY | Facility: CLINIC | Age: 37
End: 2022-09-21
Payer: COMMERCIAL

## 2022-09-21 DIAGNOSIS — F39 MOOD DISORDER (H): Primary | ICD-10-CM

## 2022-09-21 DIAGNOSIS — F15.10 CAFFEINE ABUSE (H): ICD-10-CM

## 2022-09-21 DIAGNOSIS — F90.2 ATTENTION DEFICIT HYPERACTIVITY DISORDER (ADHD), COMBINED TYPE: ICD-10-CM

## 2022-09-21 DIAGNOSIS — F39 MOOD DISORDER (H): ICD-10-CM

## 2022-09-21 DIAGNOSIS — F43.10 PTSD (POST-TRAUMATIC STRESS DISORDER): ICD-10-CM

## 2022-09-21 DIAGNOSIS — F12.10 CANNABIS ABUSE: ICD-10-CM

## 2022-09-21 DIAGNOSIS — F41.9 ANXIETY: ICD-10-CM

## 2022-09-21 DIAGNOSIS — F90.9 ATTENTION DEFICIT HYPERACTIVITY DISORDER (ADHD), UNSPECIFIED ADHD TYPE: Primary | ICD-10-CM

## 2022-09-21 PROCEDURE — 99204 OFFICE O/P NEW MOD 45 MIN: CPT | Mod: GT | Performed by: PSYCHIATRY & NEUROLOGY

## 2022-09-21 PROCEDURE — 90791 PSYCH DIAGNOSTIC EVALUATION: CPT | Mod: GT | Performed by: PSYCHOLOGIST

## 2022-09-21 RX ORDER — CLONIDINE HYDROCHLORIDE 0.1 MG/1
TABLET ORAL
Qty: 45 TABLET | Refills: 1 | Status: SHIPPED | OUTPATIENT
Start: 2022-09-21 | End: 2022-11-21

## 2022-09-21 ASSESSMENT — COLUMBIA-SUICIDE SEVERITY RATING SCALE - C-SSRS
1. IN THE PAST MONTH, HAVE YOU WISHED YOU WERE DEAD OR WISHED YOU COULD GO TO SLEEP AND NOT WAKE UP?: YES
TOTAL  NUMBER OF INTERRUPTED ATTEMPTS PAST 3 MONTHS: YES
1. HAVE YOU WISHED YOU WERE DEAD OR WISHED YOU COULD GO TO SLEEP AND NOT WAKE UP?: YES
TOTAL  NUMBER OF ACTUAL ATTEMPTS LIFETIME: 1
TOTAL  NUMBER OF ACTUAL ATTEMPTS PAST 3 MONTHS: 1
6. HAVE YOU EVER DONE ANYTHING, STARTED TO DO ANYTHING, OR PREPARED TO DO ANYTHING TO END YOUR LIFE?: NO
2. HAVE YOU ACTUALLY HAD ANY THOUGHTS OF KILLING YOURSELF?: YES
TOTAL  NUMBER OF INTERRUPTED ATTEMPTS LIFETIME: 1
5. HAVE YOU STARTED TO WORK OUT OR WORKED OUT THE DETAILS OF HOW TO KILL YOURSELF? DO YOU INTEND TO CARRY OUT THIS PLAN?: YES
TOTAL  NUMBER OF ABORTED OR SELF INTERRUPTED ATTEMPTS LIFETIME: NO
2. HAVE YOU ACTUALLY HAD ANY THOUGHTS OF KILLING YOURSELF?: YES
4. HAVE YOU HAD THESE THOUGHTS AND HAD SOME INTENTION OF ACTING ON THEM?: YES
3. HAVE YOU BEEN THINKING ABOUT HOW YOU MIGHT KILL YOURSELF?: NO
TOTAL  NUMBER OF INTERRUPTED ATTEMPTS LIFETIME: YES
ATTEMPT PAST THREE MONTHS: YES
5. HAVE YOU STARTED TO WORK OUT OR WORKED OUT THE DETAILS OF HOW TO KILL YOURSELF? DO YOU INTEND TO CARRY OUT THIS PLAN?: NO
4. HAVE YOU HAD THESE THOUGHTS AND HAD SOME INTENTION OF ACTING ON THEM?: NO
ATTEMPT LIFETIME: YES
TOTAL  NUMBER OF INTERRUPTED ATTEMPTS PAST 3 MONTHS: 1
2. HAVE YOU ACTUALLY HAD ANY THOUGHTS OF KILLING YOURSELF?: SEVERAL TIMES

## 2022-09-21 ASSESSMENT — ANXIETY QUESTIONNAIRES
4. TROUBLE RELAXING: NEARLY EVERY DAY
GAD7 TOTAL SCORE: 13
GAD7 TOTAL SCORE: 13
8. IF YOU CHECKED OFF ANY PROBLEMS, HOW DIFFICULT HAVE THESE MADE IT FOR YOU TO DO YOUR WORK, TAKE CARE OF THINGS AT HOME, OR GET ALONG WITH OTHER PEOPLE?: VERY DIFFICULT
3. WORRYING TOO MUCH ABOUT DIFFERENT THINGS: SEVERAL DAYS
7. FEELING AFRAID AS IF SOMETHING AWFUL MIGHT HAPPEN: NEARLY EVERY DAY
2. NOT BEING ABLE TO STOP OR CONTROL WORRYING: MORE THAN HALF THE DAYS
7. FEELING AFRAID AS IF SOMETHING AWFUL MIGHT HAPPEN: NEARLY EVERY DAY
6. BECOMING EASILY ANNOYED OR IRRITABLE: SEVERAL DAYS
5. BEING SO RESTLESS THAT IT IS HARD TO SIT STILL: NOT AT ALL
GAD7 TOTAL SCORE: 13
1. FEELING NERVOUS, ANXIOUS, OR ON EDGE: NEARLY EVERY DAY
IF YOU CHECKED OFF ANY PROBLEMS ON THIS QUESTIONNAIRE, HOW DIFFICULT HAVE THESE PROBLEMS MADE IT FOR YOU TO DO YOUR WORK, TAKE CARE OF THINGS AT HOME, OR GET ALONG WITH OTHER PEOPLE: VERY DIFFICULT

## 2022-09-21 ASSESSMENT — PATIENT HEALTH QUESTIONNAIRE - PHQ9
SUM OF ALL RESPONSES TO PHQ QUESTIONS 1-9: 17
SUM OF ALL RESPONSES TO PHQ QUESTIONS 1-9: 17
10. IF YOU CHECKED OFF ANY PROBLEMS, HOW DIFFICULT HAVE THESE PROBLEMS MADE IT FOR YOU TO DO YOUR WORK, TAKE CARE OF THINGS AT HOME, OR GET ALONG WITH OTHER PEOPLE: EXTREMELY DIFFICULT

## 2022-09-21 NOTE — Clinical Note
Hi there! Saw Sohan today. Nice ethan, definitely not doing well:( Wanted to reach out ASAP about the tx plan. I agree that I think there is PTSD. I also think he may have pretty severe RSD (rejection sensitive dysphoria) related to his ADHD exacerbated by hx of trauma & further triggered by wife and that situation. He also is abusing caffeine (800-1000 mg daily). That level of caffeine use, combined with his Adderall use, and degree of reactivity/sensitivity could not surprisingly lead to a misdx of Bipolar. Still monitoring for it of course, but hard to say for sure right now. With recent soft pressure, and being on a low dose of lisinopril, I did substitute it for clonidine (to decrease his adrenergic drive). I don't ever make changes like this often so PLEASE don't hesitate to let me know if you are not ok with this and I told him I would reach out to him ASAP.I just REALLY think he might benefit from clonidine if tolerated but he felt a little dizzy on lisinopril already and felt I can't do both.-Soraida

## 2022-09-21 NOTE — Clinical Note
So sorry for the delay. I had some things going on in Sept and got very behind. I had scheduling reach out to Sohan around 9/21 but looks like he never returned calls. I sent another message for them to reach out this week to hopefully get him scheduled for follow-up. Thanks! -Soraida

## 2022-09-21 NOTE — PATIENT INSTRUCTIONS
Treatment Plan:  Continue Adderall XR 30 mg TWICE daily for ADHD.   Continue risperidone 1 mg twice daily for agitation, anxiety, mood. Can also take 0.5 mg three times daily as needed for anxiety/agitation. We could consider replacing this with olanzapine in the future.   Discontinue lisinopril for now since starting clonidine. I will reach out to your primary care provider to ensure this is ok and will let you know ASAP if the plan changes.   Start clonidine 0.05 mg (half-tab) every AM and 0.05- 0.1 mg (half to full tab) every evening for ADHD, anxiety, sleep.   Decrease your caffeine intake. We discussed slowly decreasing your caffeine intake and working towards a goal of no more than one energy drink daily.   Will need to consider adding/starting an selective serotonin reuptake inhibitor for PTSD, mood, anxiety at future visit.   Continue therapy as planned.   Continue all other cares per primary care provider.   Continue all other medications as reviewed per electronic medical record today.   Safety plan reviewed. To the Emergency Department as needed or call after hours crisis line at [phone] or [phone]. [place] Crisis Text Line: Text [place] to 688166  or  Suicide LifeLine Chat: suicidepreventionlifeline.org/chat  Schedule an appointment with me in 3-4 weeks or sooner as needed.  Call [facility] at [phone] to schedule.  Follow up with primary care provider as planned or sooner if needed for acute medical concerns.  Call the psychiatric nurse line with medication questions or concerns at [phone].  [employer] may be used to communicate with your provider, but this is not intended to be used for emergencies.    Patient Education:  Discussed risks of too much caffeine intake, including stroke, heart problems, high blood pressure, dangers with Adderall/stimulant use, etc.     https://www."[employer].StepsAway/caffeine-addiction-4157287    Book Recommendation:  The Body Keeps the  "Score    Rejection Sensitive Dysphoria:  https://www.[employer]/what-is-rejection-sensitivity-2567394    Get Out of Your Mind & Into Your Life   By Danilo Silverman    The Happiness Trap: How to Stop Struggling and Start Living  By Klaus Suggs    The Reality Slap: Finding Peace & Fulfillment When Life Hurts  By Klaus Suggs    Things Might Go Terribly, Horribly Wrong: A Guide to Life Liberated from Anxiety  By Viktoria Monreal, PhD    Stress Less, Live More: How Acceptance and Commitment Therapy Can Help You Live a Busy Yet Balanced Life  By Alexx Yanez    Finding Life Beyond Trauma: Using Acceptance and Commitment Therapy to Heal From Post-Traumatic Stress and Trauma-Related Problems  By Liyah Jara and Bailey Lacey    Other ACT Skills References     Klaus Suggs on YouTube - Demonstrates several different skills to deal with difficult thoughts and feelings     Book:  \"A Liberated Mind: How to Pivot Toward What Matters\" by Danilo Silverman     Psychological flexibility: How love turns pain into purpose  Tedx Talk by Dr. Silverman discussing his struggle with anxiety and panic disorder which motivated him to develop ACT therapy (Acceptance and Commitment Therapy)     You Are Not Your Thoughts    Community Resources:    National Suicide Prevention Lifeline: 830.380.3343 (TTY: 701.543.4516). Call anytime for help.  (www.suicidepreventionlifeline.org)  National [place] on Mental Illness (www.meena.org): 987.371.9019 or 449-607-4970.   Mental Health Association (www.mentalhealth.org): 823.944.1021 or 932-731-5130.  [place] Crisis Text Line: Text [place] to 195260  Suicide LifeLine Chat: suicidepreventionlifeline.org/chat  "
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
## 2022-09-21 NOTE — Clinical Note
Please call this patient to get them scheduled for a follow-up visit in 3-4 weeks. Please schedule with me and the [facility]. Thanks!

## 2022-09-25 ENCOUNTER — HEALTH MAINTENANCE LETTER (OUTPATIENT)
Age: 37
End: 2022-09-25

## 2022-10-03 DIAGNOSIS — F90.9 ATTENTION DEFICIT HYPERACTIVITY DISORDER (ADHD), UNSPECIFIED ADHD TYPE: ICD-10-CM

## 2022-10-03 RX ORDER — DEXTROAMPHETAMINE SACCHARATE, AMPHETAMINE ASPARTATE MONOHYDRATE, DEXTROAMPHETAMINE SULFATE AND AMPHETAMINE SULFATE 7.5; 7.5; 7.5; 7.5 MG/1; MG/1; MG/1; MG/1
30 CAPSULE, EXTENDED RELEASE ORAL 2 TIMES DAILY
Qty: 60 CAPSULE | Refills: 0 | Status: SHIPPED | OUTPATIENT
Start: 2022-10-03 | End: 2022-11-02

## 2022-10-03 NOTE — TELEPHONE ENCOUNTER
Medication Question or Refill    Contacts       Type Contact Phone/Fax    10/03/2022 11:46 AM CDT Phone (Incoming) Sohan Ames (Self) [phone] (M)          What medication are you calling about (include dose and sig)?: Adderall 30mg     Controlled Substance Agreement on file:   CSA -- Patient Level:    Controlled Substance Agreement - Non - Opioid - Scan on 6/15/2022 12:52 PM: NON-OPIOID CONTROLLED SUBSTANCE AGREEMENT  Controlled Substance Agreement - Non - Opioid - Scan on 5/27/2021  Controlled Substance Agreement - Non - Opioid - Scan on 8/18/2020: NON-OPIOID CONTROLLED SUBSTANCE AGREEMENT       Who prescribed the medication?: Catalan     Do you need a refill? Yes    When did you use the medication last? Today     Patient offered an appointment? No    Do you have any questions or concerns?  No    Preferred Pharmacy:   [id number] [address] AT [address]  [address]  [facility] [address]  Phone: [phone] Fax: [phone]    Could we send this information to you in [facility] or would you prefer to receive a phone call?:   Patient would prefer a phone call   Okay to leave a detailed message?: Yes at Cell number on file:    Telephone Information:   Mobile [phone]

## 2022-11-02 DIAGNOSIS — F90.9 ATTENTION DEFICIT HYPERACTIVITY DISORDER (ADHD), UNSPECIFIED ADHD TYPE: ICD-10-CM

## 2022-11-02 RX ORDER — DEXTROAMPHETAMINE SACCHARATE, AMPHETAMINE ASPARTATE MONOHYDRATE, DEXTROAMPHETAMINE SULFATE AND AMPHETAMINE SULFATE 7.5; 7.5; 7.5; 7.5 MG/1; MG/1; MG/1; MG/1
30 CAPSULE, EXTENDED RELEASE ORAL 2 TIMES DAILY
Qty: 60 CAPSULE | Refills: 0 | Status: SHIPPED | OUTPATIENT
Start: 2022-11-02 | End: 2022-12-05

## 2022-11-02 NOTE — TELEPHONE ENCOUNTER
Refill Request  Medication name: Pending Prescriptions:                       Disp   Refills    amphetamine-dextroamphetamine (ADDERALL X*60 cap*0            Sig: Take 1 capsule (30 mg) by mouth 2 times daily    Requested Pharmacy: [facility]

## 2022-12-05 DIAGNOSIS — F90.9 ATTENTION DEFICIT HYPERACTIVITY DISORDER (ADHD), UNSPECIFIED ADHD TYPE: ICD-10-CM

## 2022-12-05 RX ORDER — DEXTROAMPHETAMINE SACCHARATE, AMPHETAMINE ASPARTATE MONOHYDRATE, DEXTROAMPHETAMINE SULFATE AND AMPHETAMINE SULFATE 7.5; 7.5; 7.5; 7.5 MG/1; MG/1; MG/1; MG/1
30 CAPSULE, EXTENDED RELEASE ORAL 2 TIMES DAILY
Qty: 60 CAPSULE | Refills: 0 | Status: SHIPPED | OUTPATIENT
Start: 2022-12-05 | End: 2023-01-02

## 2022-12-05 NOTE — TELEPHONE ENCOUNTER
Medication Question or Refill    Contacts       Type Contact Phone/Fax    12/05/2022 10:04 AM CST Phone (Incoming) Sohan MITZY (Self) [phone] (M)          What medication are you calling about (include dose and sig)?: Adderall XR 30mg     Controlled Substance Agreement on file:   CSA -- Patient Level:     [Media Unavailable] Controlled Substance Agreement - Non - Opioid - Scan on 6/15/2022 12:52 PM: NON-OPIOID CONTROLLED SUBSTANCE AGREEMENT   [Media Unavailable] Controlled Substance Agreement - Non - Opioid - Scan on 5/27/2021   [Media Unavailable] Controlled Substance Agreement - Non - Opioid - Scan on 8/18/2020: NON-OPIOID CONTROLLED SUBSTANCE AGREEMENT       Who prescribed the medication?: Catalan     Do you need a refill? Yes: out in a couple of days     When did you use the medication last? Today     Patient offered an appointment? No    Do you have any questions or concerns?  No    Preferred Pharmacy:   [id number] [place] [address] AT [address]  [address]  [facility] [address]  Phone: [phone] Fax: [phone]        Could we send this information to you in [employer] or would you prefer to receive a phone call?:   Patient would prefer a phone call   Okay to leave a detailed message?: Yes at Cell number on file:    Telephone Information:   Mobile [phone]

## 2023-01-02 DIAGNOSIS — F90.9 ATTENTION DEFICIT HYPERACTIVITY DISORDER (ADHD), UNSPECIFIED ADHD TYPE: ICD-10-CM

## 2023-01-02 RX ORDER — DEXTROAMPHETAMINE SACCHARATE, AMPHETAMINE ASPARTATE MONOHYDRATE, DEXTROAMPHETAMINE SULFATE AND AMPHETAMINE SULFATE 7.5; 7.5; 7.5; 7.5 MG/1; MG/1; MG/1; MG/1
30 CAPSULE, EXTENDED RELEASE ORAL 2 TIMES DAILY
Qty: 60 CAPSULE | Refills: 0 | Status: SHIPPED | OUTPATIENT
Start: 2023-01-02 | End: 2023-01-31

## 2023-01-23 ENCOUNTER — MYC REFILL (OUTPATIENT)
Dept: PSYCHIATRY | Facility: CLINIC | Age: 38
End: 2023-01-23
Payer: COMMERCIAL

## 2023-01-23 NOTE — TELEPHONE ENCOUNTER
Received a faxed refill request for clonidine 0.1 mg tablets. Last script sent states patient will need an appointment for refills.     cloNIDine (CATAPRES) 0.1 MG tablet 45 tablet 0 11/21/2022  No   Sig: Take half tab by mouth in AM and half to full tab at bedtime as tolerated. Need appt for refills.     RN denied refill request related to needs to be seen by provider.     RN sent [employer] message requesting he make an appointment. Also routing to A team to reach out to patient.     Routing to provider for review.     Ronit Christianson RN on 1/23/2023 at 11:40 AM

## 2023-01-31 DIAGNOSIS — F90.9 ATTENTION DEFICIT HYPERACTIVITY DISORDER (ADHD), UNSPECIFIED ADHD TYPE: ICD-10-CM

## 2023-01-31 RX ORDER — DEXTROAMPHETAMINE SACCHARATE, AMPHETAMINE ASPARTATE MONOHYDRATE, DEXTROAMPHETAMINE SULFATE AND AMPHETAMINE SULFATE 7.5; 7.5; 7.5; 7.5 MG/1; MG/1; MG/1; MG/1
30 CAPSULE, EXTENDED RELEASE ORAL 2 TIMES DAILY
Qty: 60 CAPSULE | Refills: 0 | Status: SHIPPED | OUTPATIENT
Start: 2023-01-31 | End: 2023-03-02

## 2023-02-13 ENCOUNTER — VIRTUAL VISIT (OUTPATIENT)
Dept: PSYCHOLOGY | Facility: CLINIC | Age: 38
End: 2023-02-13
Attending: PSYCHIATRY & NEUROLOGY
Payer: COMMERCIAL

## 2023-02-13 DIAGNOSIS — F43.10 PTSD (POST-TRAUMATIC STRESS DISORDER): Primary | ICD-10-CM

## 2023-02-13 DIAGNOSIS — F33.1 MAJOR DEPRESSIVE DISORDER, RECURRENT EPISODE, MODERATE WITH ANXIOUS DISTRESS (H): ICD-10-CM

## 2023-02-13 PROCEDURE — 90791 PSYCH DIAGNOSTIC EVALUATION: CPT | Mod: VID | Performed by: PSYCHOLOGIST

## 2023-02-13 ASSESSMENT — COLUMBIA-SUICIDE SEVERITY RATING SCALE - C-SSRS
1. HAVE YOU WISHED YOU WERE DEAD OR WISHED YOU COULD GO TO SLEEP AND NOT WAKE UP?: YES
TOTAL  NUMBER OF INTERRUPTED ATTEMPTS LIFETIME: NO
TOTAL  NUMBER OF ABORTED OR SELF INTERRUPTED ATTEMPTS LIFETIME: NO
LETHALITY/MEDICAL DAMAGE CODE MOST RECENT POTENTIAL ATTEMPT: BEHAVIOR LIKELY TO RESULT IN DEATH DESPITE AVAILABLE MEDICAL CARE
ATTEMPT LIFETIME: YES
LETHALITY/MEDICAL DAMAGE CODE MOST LETHAL POTENTIAL ATTEMPT: BEHAVIOR LIKELY TO RESULT IN DEATH DESPITE AVAILABLE MEDICAL CARE
2. HAVE YOU ACTUALLY HAD ANY THOUGHTS OF KILLING YOURSELF?: NO
2. HAVE YOU ACTUALLY HAD ANY THOUGHTS OF KILLING YOURSELF?: YES
5. HAVE YOU STARTED TO WORK OUT OR WORKED OUT THE DETAILS OF HOW TO KILL YOURSELF? DO YOU INTEND TO CARRY OUT THIS PLAN?: NO
LETHALITY/MEDICAL DAMAGE CODE MOST LETHAL ACTUAL ATTEMPT: MODERATE PHYSICAL DAMAGE, MEDICAL ATTENTION NEEDED
LETHALITY/MEDICAL DAMAGE CODE MOST RECENT ACTUAL ATTEMPT: MODERATE PHYSICAL DAMAGE, MEDICAL ATTENTION NEEDED
ATTEMPT PAST THREE MONTHS: NO
1. IN THE PAST MONTH, HAVE YOU WISHED YOU WERE DEAD OR WISHED YOU COULD GO TO SLEEP AND NOT WAKE UP?: YES
REASONS FOR IDEATION PAST MONTH: MOSTLY TO END OR STOP THE PAIN (YOU COULDN'T GO ON LIVING WITH THE PAIN OR HOW YOU WERE FEELING)
4. HAVE YOU HAD THESE THOUGHTS AND HAD SOME INTENTION OF ACTING ON THEM?: NO
4. HAVE YOU HAD THESE THOUGHTS AND HAD SOME INTENTION OF ACTING ON THEM?: YES
3. HAVE YOU BEEN THINKING ABOUT HOW YOU MIGHT KILL YOURSELF?: YES
6. HAVE YOU EVER DONE ANYTHING, STARTED TO DO ANYTHING, OR PREPARED TO DO ANYTHING TO END YOUR LIFE?: NO
REASONS FOR IDEATION LIFETIME: MOSTLY TO END OR STOP THE PAIN (YOU COULDN'T GO ON LIVING WITH THE PAIN OR HOW YOU WERE FEELING)
LETHALITY/MEDICAL DAMAGE CODE FIRST POTENTIAL ATTEMPT: BEHAVIOR LIKELY TO RESULT IN INJURY BUT NOT LIKELY TO CAUSE DEATH
5. HAVE YOU STARTED TO WORK OUT OR WORKED OUT THE DETAILS OF HOW TO KILL YOURSELF? DO YOU INTEND TO CARRY OUT THIS PLAN?: YES
LETHALITY/MEDICAL DAMAGE CODE FIRST ACTUAL ATTEMPT: MODERATE PHYSICAL DAMAGE, MEDICAL ATTENTION NEEDED

## 2023-02-13 ASSESSMENT — ANXIETY QUESTIONNAIRES
GAD7 TOTAL SCORE: 9
IF YOU CHECKED OFF ANY PROBLEMS ON THIS QUESTIONNAIRE, HOW DIFFICULT HAVE THESE PROBLEMS MADE IT FOR YOU TO DO YOUR WORK, TAKE CARE OF THINGS AT HOME, OR GET ALONG WITH OTHER PEOPLE: SOMEWHAT DIFFICULT
3. WORRYING TOO MUCH ABOUT DIFFERENT THINGS: SEVERAL DAYS
5. BEING SO RESTLESS THAT IT IS HARD TO SIT STILL: SEVERAL DAYS
7. FEELING AFRAID AS IF SOMETHING AWFUL MIGHT HAPPEN: MORE THAN HALF THE DAYS
1. FEELING NERVOUS, ANXIOUS, OR ON EDGE: MORE THAN HALF THE DAYS
2. NOT BEING ABLE TO STOP OR CONTROL WORRYING: NOT AT ALL
6. BECOMING EASILY ANNOYED OR IRRITABLE: SEVERAL DAYS
GAD7 TOTAL SCORE: 9

## 2023-02-13 ASSESSMENT — PATIENT HEALTH QUESTIONNAIRE - PHQ9
5. POOR APPETITE OR OVEREATING: MORE THAN HALF THE DAYS
SUM OF ALL RESPONSES TO PHQ QUESTIONS 1-9: 7
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
## 2023-02-20 ENCOUNTER — VIRTUAL VISIT (OUTPATIENT)
Dept: PSYCHOLOGY | Facility: CLINIC | Age: 38
End: 2023-02-20
Payer: COMMERCIAL

## 2023-02-20 DIAGNOSIS — F43.10 PTSD (POST-TRAUMATIC STRESS DISORDER): ICD-10-CM

## 2023-02-20 DIAGNOSIS — F33.1 MAJOR DEPRESSIVE DISORDER, RECURRENT EPISODE, MODERATE WITH ANXIOUS DISTRESS (H): Primary | ICD-10-CM

## 2023-02-20 PROCEDURE — 90832 PSYTX W PT 30 MINUTES: CPT | Mod: VID | Performed by: PSYCHOLOGIST
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
## 2023-03-02 DIAGNOSIS — F90.9 ATTENTION DEFICIT HYPERACTIVITY DISORDER (ADHD), UNSPECIFIED ADHD TYPE: ICD-10-CM

## 2023-03-02 RX ORDER — DEXTROAMPHETAMINE SACCHARATE, AMPHETAMINE ASPARTATE MONOHYDRATE, DEXTROAMPHETAMINE SULFATE AND AMPHETAMINE SULFATE 7.5; 7.5; 7.5; 7.5 MG/1; MG/1; MG/1; MG/1
30 CAPSULE, EXTENDED RELEASE ORAL 2 TIMES DAILY
Qty: 60 CAPSULE | Refills: 0 | Status: SHIPPED | OUTPATIENT
Start: 2023-03-02 | End: 2023-04-03

## 2023-03-31 DIAGNOSIS — F43.10 PTSD (POST-TRAUMATIC STRESS DISORDER): ICD-10-CM

## 2023-03-31 RX ORDER — RISPERIDONE 1 MG/1
1 TABLET ORAL 2 TIMES DAILY
Qty: 60 TABLET | Refills: 1 | Status: SHIPPED | OUTPATIENT
Start: 2023-03-31 | End: 2023-06-02

## 2023-03-31 RX ORDER — RISPERIDONE 0.5 MG/1
0.5 TABLET ORAL 3 TIMES DAILY PRN
Qty: 90 TABLET | Refills: 1 | Status: SHIPPED | OUTPATIENT
Start: 2023-03-31 | End: 2023-06-02

## 2023-03-31 NOTE — TELEPHONE ENCOUNTER
"Routing refill request to provider for review/approval because:  Drug not active on patient's medication list    Last Written Prescription Date:  8/31/2022- end 3/31/2023  Last Fill Quantity: 60,  # refills: 5   Last office visit provider:  8/31/2022     Requested Prescriptions   Pending Prescriptions Disp Refills     risperiDONE (RISPERDAL) 1 MG tablet 60 tablet 0     Sig: Take 1 tablet (1 mg) by mouth 2 times daily       Antipsychotic Medications Failed - 3/31/2023  4:53 PM        Failed - Lipid panel on file within the past 12 months     Recent Labs   Lab Test 08/11/20  1052   CHOL 124   TRIG 58   HDL 53   LDL 59               Failed - A1c or Glucose on file in past 12 months     Recent Labs   Lab Test 09/08/21  1417   GLC 94       Please review patients last 3 weights. If a weight gain of >10 lbs exists, you may refill the prescription once after instructing the patient to schedule an appointment within the next 30 days.    Wt Readings from Last 3 Encounters:   08/31/22 71.7 kg (158 lb)   07/26/22 69.4 kg (153 lb)   07/06/22 70.3 kg (155 lb)             Failed - Recent (6 mo) or future (30 days) visit within the authorizing provider's specialty     Patient had office visit in the last 6 months or has a visit in the next 30 days with authorizing provider or within the authorizing provider's specialty.  See \"Patient Info\" tab in inbasket, or \"Choose Columns\" in Meds & Orders section of the refill encounter.            Passed - Blood pressure under 140/90 in past 12 months     BP Readings from Last 3 Encounters:   08/31/22 100/60   07/26/22 110/72   07/06/22 120/78                 Passed - Heart Rate on file within past 12 months     Pulse Readings from Last 3 Encounters:   08/31/22 74   07/26/22 64   07/06/22 76               Passed - Medication is active on med list        Passed - Patient is 18 years of age or older          Routing refill request to provider for review/approval because:  Drug not active on " "patient's medication list   Patient needs to be seen because it has been more than 6 months since last office visit.  Drug not active on patient's medication list    Last Written Prescription Date:  7/6/2022  Last Fill Quantity: 90,  # refills: 5  Last office visit provider:  8/31/2022        risperiDONE (RISPERDAL) 0.5 MG tablet 90 tablet 0     Sig: Take 1 tablet (0.5 mg) by mouth 3 times daily as needed       Antipsychotic Medications Failed - 3/31/2023  4:53 PM        Failed - Lipid panel on file within the past 12 months     Recent Labs   Lab Test 08/11/20  1052   CHOL 124   TRIG 58   HDL 53   LDL 59               Failed - A1c or Glucose on file in past 12 months     Recent Labs   Lab Test 09/08/21  1417   GLC 94       Please review patients last 3 weights. If a weight gain of >10 lbs exists, you may refill the prescription once after instructing the patient to schedule an appointment within the next 30 days.    Wt Readings from Last 3 Encounters:   08/31/22 71.7 kg (158 lb)   07/26/22 69.4 kg (153 lb)   07/06/22 70.3 kg (155 lb)             Failed - Recent (6 mo) or future (30 days) visit within the authorizing provider's specialty     Patient had office visit in the last 6 months or has a visit in the next 30 days with authorizing provider or within the authorizing provider's specialty.  See \"Patient Info\" tab in inbasket, or \"Choose Columns\" in Meds & Orders section of the refill encounter.            Passed - Blood pressure under 140/90 in past 12 months     BP Readings from Last 3 Encounters:   08/31/22 100/60   07/26/22 110/72   07/06/22 120/78                 Passed - Heart Rate on file within past 12 months     Pulse Readings from Last 3 Encounters:   08/31/22 74   07/26/22 64   07/06/22 76               Passed - Medication is active on med list        Passed - Patient is 18 years of age or older             Viktoria Geronimo RN 03/31/23 4:53 PM  "

## 2023-03-31 NOTE — TELEPHONE ENCOUNTER
Refill Request  Medication name: Pending Prescriptions:                       Disp   Refills    risperiDONE (RISPERDAL) 1 MG tablet       60 tab*5            Sig: Take 1 tablet (1 mg) by mouth 2 times daily    risperiDONE (RISPERDAL) 0.5 MG tablet     90 tab*5            Sig: Take 1 tablet (0.5 mg) by mouth 3 times daily as           needed    Requested [facility]acy: [facility]

## 2023-03-31 NOTE — TELEPHONE ENCOUNTER
"Routing refill request to provider for review/approval because:  Labs not current:  multiple  Patient needs to be seen because it has been more than 6 months since last office visit.    Last Written Prescription Date:  8/31/2022  Last Fill Quantity: 60,  # refills: 5   Last office visit provider:  8/31/2022     Requested Prescriptions   Pending Prescriptions Disp Refills     risperiDONE (RISPERDAL) 1 MG tablet 60 tablet 5     Sig: Take 1 tablet (1 mg) by mouth 2 times daily       Antipsychotic Medications Failed - 3/31/2023 11:58 AM        Failed - Lipid panel on file within the past 12 months     Recent Labs   Lab Test 08/11/20  1052   CHOL 124   TRIG 58   HDL 53   LDL 59               Failed - A1c or Glucose on file in past 12 months     Recent Labs   Lab Test 09/08/21  1417   GLC 94       Please review patients last 3 weights. If a weight gain of >10 lbs exists, you may refill the prescription once after instructing the patient to schedule an appointment within the next 30 days.    Wt Readings from Last 3 Encounters:   08/31/22 71.7 kg (158 lb)   07/26/22 69.4 kg (153 lb)   07/06/22 70.3 kg (155 lb)             Failed - Recent (6 mo) or future (30 days) visit within the authorizing provider's specialty     Patient had office visit in the last 6 months or has a visit in the next 30 days with authorizing provider or within the authorizing provider's specialty.  See \"Patient Info\" tab in inbasket, or \"Choose Columns\" in Meds & Orders section of the refill encounter.            Passed - Blood pressure under 140/90 in past 12 months     BP Readings from Last 3 Encounters:   08/31/22 100/60   07/26/22 110/72   07/06/22 120/78                 Passed - Heart Rate on file within past 12 months     Pulse Readings from Last 3 Encounters:   08/31/22 74   07/26/22 64   07/06/22 76               Passed - Medication is active on med list        Passed - Patient is 18 years of age or older      Routing refill request to provider " "for review/approval because:  Labs not current:  multiple  Patient needs to be seen because it has been more than 6 months since last office visit.    Last Written Prescription Date:  7/6/2022  Last Fill Quantity: 90,  # refills: 5   Last office visit provider:  8/31/2022     risperiDONE (RISPERDAL) 0.5 MG tablet 90 tablet 5     Sig: Take 1 tablet (0.5 mg) by mouth 3 times daily as needed       Antipsychotic Medications Failed - 3/31/2023 11:58 AM        Failed - Lipid panel on file within the past 12 months     Recent Labs   Lab Test 08/11/20  1052   CHOL 124   TRIG 58   HDL 53   LDL 59               Failed - A1c or Glucose on file in past 12 months     Recent Labs   Lab Test 09/08/21  1417   GLC 94       Please review patients last 3 weights. If a weight gain of >10 lbs exists, you may refill the prescription once after instructing the patient to schedule an appointment within the next 30 days.    Wt Readings from Last 3 Encounters:   08/31/22 71.7 kg (158 lb)   07/26/22 69.4 kg (153 lb)   07/06/22 70.3 kg (155 lb)             Failed - Recent (6 mo) or future (30 days) visit within the authorizing provider's specialty     Patient had office visit in the last 6 months or has a visit in the next 30 days with authorizing provider or within the authorizing provider's specialty.  See \"Patient Info\" tab in inbasket, or \"Choose Columns\" in Meds & Orders section of the refill encounter.            Passed - Blood pressure under 140/90 in past 12 months     BP Readings from Last 3 Encounters:   08/31/22 100/60   07/26/22 110/72   07/06/22 120/78                 Passed - Heart Rate on file within past 12 months     Pulse Readings from Last 3 Encounters:   08/31/22 74   07/26/22 64   07/06/22 76               Passed - Medication is active on med list        Passed - Patient is 18 years of age or older             Melissa De Santiago RN 03/31/23 4:44 PM  "

## 2023-04-03 DIAGNOSIS — F90.9 ATTENTION DEFICIT HYPERACTIVITY DISORDER (ADHD), UNSPECIFIED ADHD TYPE: ICD-10-CM

## 2023-04-03 RX ORDER — DEXTROAMPHETAMINE SACCHARATE, AMPHETAMINE ASPARTATE MONOHYDRATE, DEXTROAMPHETAMINE SULFATE AND AMPHETAMINE SULFATE 7.5; 7.5; 7.5; 7.5 MG/1; MG/1; MG/1; MG/1
30 CAPSULE, EXTENDED RELEASE ORAL 2 TIMES DAILY
Qty: 60 CAPSULE | Refills: 0 | Status: SHIPPED | OUTPATIENT
Start: 2023-04-03 | End: 2023-05-03

## 2023-05-03 DIAGNOSIS — F90.9 ATTENTION DEFICIT HYPERACTIVITY DISORDER (ADHD), UNSPECIFIED ADHD TYPE: ICD-10-CM

## 2023-05-03 RX ORDER — DEXTROAMPHETAMINE SACCHARATE, AMPHETAMINE ASPARTATE MONOHYDRATE, DEXTROAMPHETAMINE SULFATE AND AMPHETAMINE SULFATE 7.5; 7.5; 7.5; 7.5 MG/1; MG/1; MG/1; MG/1
30 CAPSULE, EXTENDED RELEASE ORAL 2 TIMES DAILY
Qty: 60 CAPSULE | Refills: 0 | Status: SHIPPED | OUTPATIENT
Start: 2023-05-03 | End: 2023-06-01

## 2023-05-03 NOTE — TELEPHONE ENCOUNTER
Pt called back, relayed message. He understood but declined scheduling with me, [facility]

## 2023-05-03 NOTE — TELEPHONE ENCOUNTER
Medication Question or Refill    Contacts       Type Contact Phone/Fax    05/03/2023 10:52 AM CDT Phone (Incoming) Sohan Ames (Self) [phone] (M)          What medication are you calling about (include dose and sig)?: Adderall XR 30mg     Preferred Pharmacy:  [facility] #[id number] - [facility] [place] [address] AT [address]  [address]  [facility] [address]  Phone: [phone] Fax: [phone]    Controlled Substance Agreement on file:   CSA -- Patient Level:     [Media Unavailable] Controlled Substance Agreement - Non - Opioid - Scan on 6/15/2022 12:52 PM: NON-OPIOID CONTROLLED SUBSTANCE AGREEMENT   [Media Unavailable] Controlled Substance Agreement - Non - Opioid - Scan on 5/27/2021   [Media Unavailable] Controlled Substance Agreement - Non - Opioid - Scan on 8/18/2020: NON-OPIOID CONTROLLED SUBSTANCE AGREEMENT       Who prescribed the medication?: Catalan     Do you need a refill? Yes    When did you use the medication last? Today     Patient offered an appointment? No    Do you have any questions or concerns?  No      Could we send this information to you in [facility] or would you prefer to receive a phone call?:   Patient would prefer a phone call   Okay to leave a detailed message?: Yes at Cell number on file:    Telephone Information:   Mobile [phone]

## 2023-05-10 DIAGNOSIS — F90.9 ATTENTION DEFICIT HYPERACTIVITY DISORDER (ADHD), UNSPECIFIED ADHD TYPE: ICD-10-CM

## 2023-05-10 RX ORDER — CLONIDINE HYDROCHLORIDE 0.1 MG/1
TABLET ORAL
Qty: 45 TABLET | Refills: 0 | Status: SHIPPED | OUTPATIENT
Start: 2023-05-10 | End: 2023-06-08

## 2023-05-10 NOTE — TELEPHONE ENCOUNTER
Refill Request  Medication name: Pending Prescriptions:                       Disp   Refills    cloNIDine (CATAPRES) 0.1 MG tablet        45 tab*0            Sig: Take half tab by mouth in AM and half to full tab           at bedtime as tolerated. Need appt for refills.    Requested Pharmacy:  [id number] [place] [facility] [place] [address] [place] [facility] [place] [place] [address]       PT IS OUT AND HAS BEEN FOR 3DAYS

## 2023-06-01 DIAGNOSIS — F43.10 PTSD (POST-TRAUMATIC STRESS DISORDER): ICD-10-CM

## 2023-06-01 DIAGNOSIS — F90.9 ATTENTION DEFICIT HYPERACTIVITY DISORDER (ADHD), UNSPECIFIED ADHD TYPE: ICD-10-CM

## 2023-06-01 RX ORDER — DEXTROAMPHETAMINE SACCHARATE, AMPHETAMINE ASPARTATE MONOHYDRATE, DEXTROAMPHETAMINE SULFATE AND AMPHETAMINE SULFATE 7.5; 7.5; 7.5; 7.5 MG/1; MG/1; MG/1; MG/1
30 CAPSULE, EXTENDED RELEASE ORAL 2 TIMES DAILY
Qty: 60 CAPSULE | Refills: 0 | Status: SHIPPED | OUTPATIENT
Start: 2023-06-01 | End: 2023-07-05

## 2023-06-01 NOTE — TELEPHONE ENCOUNTER
Refill Request  Medication name: Pending Prescriptions:                       Disp   Refills    amphetamine-dextroamphetamine (ADDERALL X*60 cap*0            Sig: Take 1 capsule (30 mg) by mouth 2 times daily    Requested Pharmacy:  [facility] #[id number] - [place], [place] - [address] AT [facility] [address]

## 2023-06-01 NOTE — TELEPHONE ENCOUNTER
Refill Request  Medication name: Pending Prescriptions:                       Disp   Refills    risperiDONE (RISPERDAL) 0.5 MG tablet     90 tab*1            Sig: Take 1 tablet (0.5 mg) by mouth 3 times daily as           needed    risperiDONE (RISPERDAL) 1 MG tablet       60 tab*1            Sig: Take 1 tablet (1 mg) by mouth 2 times daily    Requested Pharmacy:  [facility] #[id number] [place] [id number] [place] [facility] [place] [facility] [address]

## 2023-06-02 RX ORDER — RISPERIDONE 0.5 MG/1
0.5 TABLET ORAL 3 TIMES DAILY PRN
Qty: 90 TABLET | Refills: 1 | Status: SHIPPED | OUTPATIENT
Start: 2023-06-02 | End: 2023-07-11

## 2023-06-02 RX ORDER — RISPERIDONE 1 MG/1
1 TABLET ORAL 2 TIMES DAILY
Qty: 60 TABLET | Refills: 1 | Status: SHIPPED | OUTPATIENT
Start: 2023-06-02 | End: 2023-07-11

## 2023-06-02 NOTE — TELEPHONE ENCOUNTER
"Routing refill request to provider for review/approval because:  Patient needs to be seen because it has been more than 6 months since last office visit.  Next office visit on 7/11/23    Last Written Prescription Date:  3/31/23   Last Fill Quantity: 90,  # refills: 1   Last office visit provider:  8/31/22         Requested Prescriptions   Pending Prescriptions Disp Refills     risperiDONE (RISPERDAL) 0.5 MG tablet 90 tablet 1     Sig: Take 1 tablet (0.5 mg) by mouth 3 times daily as needed       Antipsychotic Medications Failed - 6/1/2023 12:41 PM   APPROVED - LIPID PANEL on 6/15/22     Failed - Lipid panel on file within the past 12 months     Recent Labs   Lab Test 08/11/20  1052   CHOL 124   TRIG 58   HDL 53   LDL 59               Failed - A1c or Glucose on file in past 12 months    APPROVED - Glucose 97 on 6/15/22     Recent Labs   Lab Test 09/08/21  1417   GLC 94       Please review patients last 3 weights. If a weight gain of >10 lbs exists, you may refill the prescription once after instructing the patient to schedule an appointment within the next 30 days.    Wt Readings from Last 3 Encounters:   08/31/22 71.7 kg (158 lb)   07/26/22 69.4 kg (153 lb)   07/06/22 70.3 kg (155 lb)             Failed - Recent (6 mo) or future (30 days) visit within the authorizing provider's specialty     Patient had office visit in the last 6 months or has a visit in the next 30 days with authorizing provider or within the authorizing provider's specialty.  See \"Patient Info\" tab in inbasket, or \"Choose Columns\" in Meds & Orders section of the refill encounter.            Passed - Blood pressure under 140/90 in past 12 months     BP Readings from Last 3 Encounters:   08/31/22 100/60   07/26/22 110/72   07/06/22 120/78                 Passed - Heart Rate on file within past 12 months     Pulse Readings from Last 3 Encounters:   08/31/22 74   07/26/22 64   07/06/22 76               Passed - Medication is active on med list        " "Passed - Patient is 18 years of age or older      Routing refill request to provider for review/approval because:  Patient needs to be seen because it has been more than 6 months since last office visit.    Last Written Prescription Date:  3/31/23   Last Fill Quantity: 60,  # refills: 1   Last office visit provider:  8/31/22        risperiDONE (RISPERDAL) 1 MG tablet 60 tablet 1     Sig: Take 1 tablet (1 mg) by mouth 2 times daily       Antipsychotic Medications Failed - 6/1/2023 12:41 PM   APPROVED - LIPID PANEL on 6/15/22     Failed - Lipid panel on file within the past 12 months     Recent Labs   Lab Test 08/11/20  1052   CHOL 124   TRIG 58   HDL 53   LDL 59               Failed - A1c or Glucose on file in past 12 months  APPROVED - Glucose 97 on 6/15/22     Recent Labs   Lab Test 09/08/21  1417   GLC 94       Please review patients last 3 weights. If a weight gain of >10 lbs exists, you may refill the prescription once after instructing the patient to schedule an appointment within the next 30 days.    Wt Readings from Last 3 Encounters:   08/31/22 71.7 kg (158 lb)   07/26/22 69.4 kg (153 lb)   07/06/22 70.3 kg (155 lb)             Failed - Recent (6 mo) or future (30 days) visit within the authorizing provider's specialty     Patient had office visit in the last 6 months or has a visit in the next 30 days with authorizing provider or within the authorizing provider's specialty.  See \"Patient Info\" tab in inbasket, or \"Choose Columns\" in Meds & Orders section of the refill encounter.            Passed - Blood pressure under 140/90 in past 12 months     BP Readings from Last 3 Encounters:   08/31/22 100/60   07/26/22 110/72   07/06/22 120/78                 Passed - Heart Rate on file within past 12 months     Pulse Readings from Last 3 Encounters:   08/31/22 74   07/26/22 64   07/06/22 76               Passed - Medication is active on med list        Passed - Patient is 18 years of age or older       "       Olivia Caldwell RN 06/02/23 11:57 AM

## 2023-06-08 DIAGNOSIS — F90.9 ATTENTION DEFICIT HYPERACTIVITY DISORDER (ADHD), UNSPECIFIED ADHD TYPE: ICD-10-CM

## 2023-06-08 RX ORDER — CLONIDINE HYDROCHLORIDE 0.1 MG/1
TABLET ORAL
Qty: 45 TABLET | Refills: 0 | Status: SHIPPED | OUTPATIENT
Start: 2023-06-08 | End: 2023-07-11

## 2023-06-08 NOTE — TELEPHONE ENCOUNTER
Refill Request  Medication name: Pending Prescriptions:                       Disp   Refills    cloNIDine (CATAPRES) 0.1 MG tablet        45 tab*0            Sig: Take half tab by mouth in AM and half to full tab           at bedtime as tolerated. Need appt for refills.    Requested Pharmacy:  [facility] #[id number] - [facility] [place] [address] AT [facility] OF [facility]

## 2023-07-03 DIAGNOSIS — F90.9 ATTENTION DEFICIT HYPERACTIVITY DISORDER (ADHD), UNSPECIFIED ADHD TYPE: ICD-10-CM

## 2023-07-03 NOTE — TELEPHONE ENCOUNTER
Medication Question or Refill    Contacts       Type Contact Phone/Fax    07/03/2023 11:12 AM CDT Phone (Incoming) Sohan Ames (Self) [phone] (M)          What medication are you calling about (include dose and sig)?: amphetamine-dextroamphetamine (ADDERALL XR) 30 MG 24 hr capsule    Preferred Pharmacy:   [facility] #[id number] [address] AT [place] [address]  [facility] [place] [address]  Phone: [phone] Fax: [phone]      Controlled Substance Agreement on file:   CSA -- Patient Level:     [Media Unavailable] Controlled Substance Agreement - Non - Opioid - Scan on 6/15/2022 12:52 PM: NON-OPIOID CONTROLLED SUBSTANCE AGREEMENT   [Media Unavailable] Controlled Substance Agreement - Non - Opioid - Scan on 5/27/2021   [Media Unavailable] Controlled Substance Agreement - Non - Opioid - Scan on 8/18/2020: NON-OPIOID CONTROLLED SUBSTANCE AGREEMENT       Who prescribed the medication?: Dr. Catalan    Do you need a refill? Yes, patient has 2 tablets left    When did you use the medication last? 7/3/23    Patient offered an appointment? No    Do you have any questions or concerns?  No      Could we send this information to you in [facility] or would you prefer to receive a phone call?:   Patient would prefer a phone call   Okay to leave a detailed message?: No at Cell number on file:    Telephone Information:   Mobile [phone]     Kaitlynn Prasad RN, BSN, PHN  [facility]

## 2023-07-05 RX ORDER — DEXTROAMPHETAMINE SACCHARATE, AMPHETAMINE ASPARTATE MONOHYDRATE, DEXTROAMPHETAMINE SULFATE AND AMPHETAMINE SULFATE 7.5; 7.5; 7.5; 7.5 MG/1; MG/1; MG/1; MG/1
30 CAPSULE, EXTENDED RELEASE ORAL 2 TIMES DAILY
Qty: 60 CAPSULE | Refills: 0 | Status: SHIPPED | OUTPATIENT
Start: 2023-07-05 | End: 2023-08-02

## 2023-07-11 ENCOUNTER — OFFICE VISIT (OUTPATIENT)
Dept: FAMILY MEDICINE | Facility: CLINIC | Age: 38
End: 2023-07-11
Payer: COMMERCIAL

## 2023-07-11 VITALS
HEART RATE: 71 BPM | WEIGHT: 160 LBS | OXYGEN SATURATION: 98 % | SYSTOLIC BLOOD PRESSURE: 144 MMHG | BODY MASS INDEX: 22.4 KG/M2 | HEIGHT: 71 IN | DIASTOLIC BLOOD PRESSURE: 100 MMHG

## 2023-07-11 DIAGNOSIS — G89.29 CHRONIC ABDOMINAL PAIN: ICD-10-CM

## 2023-07-11 DIAGNOSIS — F90.9 ATTENTION DEFICIT HYPERACTIVITY DISORDER (ADHD), UNSPECIFIED ADHD TYPE: Primary | ICD-10-CM

## 2023-07-11 DIAGNOSIS — F43.10 PTSD (POST-TRAUMATIC STRESS DISORDER): ICD-10-CM

## 2023-07-11 DIAGNOSIS — L30.9 DERMATITIS: ICD-10-CM

## 2023-07-11 DIAGNOSIS — R10.9 CHRONIC ABDOMINAL PAIN: ICD-10-CM

## 2023-07-11 PROCEDURE — 99214 OFFICE O/P EST MOD 30 MIN: CPT | Performed by: FAMILY MEDICINE

## 2023-07-11 RX ORDER — TRIAMCINOLONE ACETONIDE 1 MG/G
CREAM TOPICAL 2 TIMES DAILY PRN
Qty: 30 G | Refills: 1 | Status: SHIPPED | OUTPATIENT
Start: 2023-07-11

## 2023-07-11 RX ORDER — CLONIDINE HYDROCHLORIDE 0.1 MG/1
TABLET ORAL
Qty: 45 TABLET | Refills: 5 | Status: SHIPPED | OUTPATIENT
Start: 2023-07-11

## 2023-07-11 RX ORDER — RISPERIDONE 1 MG/1
1 TABLET ORAL 2 TIMES DAILY
Qty: 60 TABLET | Refills: 5 | Status: SHIPPED | OUTPATIENT
Start: 2023-07-11

## 2023-07-11 RX ORDER — RISPERIDONE 0.5 MG/1
0.5 TABLET ORAL 3 TIMES DAILY PRN
Qty: 90 TABLET | Refills: 5 | Status: SHIPPED | OUTPATIENT
Start: 2023-07-11

## 2023-07-11 RX ORDER — GABAPENTIN 300 MG/1
CAPSULE ORAL
Qty: 450 CAPSULE | Refills: 5 | Status: SHIPPED | OUTPATIENT
Start: 2023-07-11

## 2023-07-11 ASSESSMENT — PATIENT HEALTH QUESTIONNAIRE - PHQ9
SUM OF ALL RESPONSES TO PHQ QUESTIONS 1-9: 6
10. IF YOU CHECKED OFF ANY PROBLEMS, HOW DIFFICULT HAVE THESE PROBLEMS MADE IT FOR YOU TO DO YOUR WORK, TAKE CARE OF THINGS AT HOME, OR GET ALONG WITH OTHER PEOPLE: SOMEWHAT DIFFICULT
SUM OF ALL RESPONSES TO PHQ QUESTIONS 1-9: 6

## 2023-07-11 ASSESSMENT — COLUMBIA-SUICIDE SEVERITY RATING SCALE - C-SSRS
2. IN THE PAST MONTH, HAVE YOU ACTUALLY HAD ANY THOUGHTS OF KILLING YOURSELF?: NO
1. WITHIN THE PAST MONTH, HAVE YOU WISHED YOU WERE DEAD OR WISHED YOU COULD GO TO SLEEP AND NOT WAKE UP?: NO

## 2023-07-11 NOTE — LETTER
[facility] BRIGITTE [place]  07/11/23  Patient: Aurelio Ames  YOB: 1985  Medical Record Number: [id number]                                                                                  Non-Opioid Controlled Substance Agreement    This is an agreement between you and your provider regarding safe and appropriate use of controlled substances prescribed by your care team. Controlled substances are?medicines that can cause physical and mental dependence (abuse).     There are strict laws about having and using these medicines. We here at [facility] are  committed to working with you in your efforts to get better. To support you in this work, we'll help you schedule regular office appointments for medicine refills. If we must cancel or change your appointment for any reason, we'll make sure you have enough medicine to last until your next appointment.     As a Provider, I will:   Listen carefully to your concerns while treating you with respect.   Recommend a treatment plan that I believe is in your best interest and may involve therapies other than medicine.    Talk with you often about the possible benefits and the risk of harm of any medicine that we prescribe for you.  Assess the safety of this medicine and check how well it works.    Provide a plan on how to taper (discontinue or go off) using this medicine if the decision is made to stop its use.      ::  As a Patient, I understand controlled substances:     Are prescribed by my care provider to help me function or work and manage my condition(s).?  Are strong medicines and can cause serious side effects.     Need to be taken exactly as prescribed.?Combining controlled substances with certain medicines or chemicals (such as illegal drugs, alcohol, sedatives, sleeping pills, and benzodiazepines) can be dangerous or even fatal.? If I stop taking my medicines suddenly, I may have severe withdrawal symptoms.     The risks,  benefits, and side effects of these medicine(s) were explained to me. I agree that:    I will take part in other treatments as advised by my care team. This may be psychiatry or counseling, physical therapy, behavioral therapy, group treatment or a referral to specialist.    I will keep all my appointments and understand this is part of the monitoring of controlled substances.?My care team may require an office visit for EVERY controlled substance refill. If I miss appointments or don t follow instructions, my care team may stop my medicine    I will take my medicines as prescribed. I will not change the dose or schedule unless my care team tells me to. There will be no refills if I run out early.      I may be asked to come to the clinic and complete a urine drug test or complete a pill count. If I don t give a urine sample or participate in a pill count, the care team may stop my medicine.    I will only receive controlled substance prescriptions from this clinic. If I am treated by another provider, I will tell them that I am taking controlled substances and that I have a treatment agreement with this provider. I will inform my [facility] care team within one business day if I am given a prescription for any controlled substance by another healthcare provider. My [facility] care team can contact other providers and pharmacists about my use of any medicines.    It is up to me to make sure that I don't run out of my medicines on weekends or holidays.?If my care team is willing to refill my prescription without a visit, I must request refills only during office hours. Refills may take up to 3 business days to process. I will use one pharmacy to fill all my controlled substance prescriptions. I will notify the clinic about any changes to my insurance or medicine availability.    I am responsible for my prescriptions. If the medicine/prescription is lost, stolen or destroyed, it will not be replaced.?I  also agree not to share controlled substance medicines with anyone.     I am aware I should not use any illegal or recreational drugs. I agree not to drink alcohol unless my care team says I can.     If I enroll in the [place] Medical Cannabis program, I will tell my care team before my next refill.    I will tell my care team right away if I become pregnant, have a new medical problem treated outside of my regular clinic, or have a change in my medicines.     I understand that this medicine can affect my thinking, judgment and reaction time.? Alcohol and drugs affect the brain and body, which can affect the safety of my driving. Being under the influence of alcohol or drugs can affect my decision-making, behaviors, personal safety and the safety of others. Driving while impaired (DWI) can occur if a person is driving, operating or in physical control of a car, motorcycle, boat, snowmobile, ATV, motorbike, off-road vehicle or any other motor vehicle ([place] Statute 169[place].20). I understand the risk if I choose to drive or operate any vehicle or machinery.    I understand that if I do not follow any of the conditions above, my prescriptions or treatment may be stopped or changed.   I agree that my provider, clinic care team and pharmacy may work with any city, state or federal law enforcement agency that investigates the misuse, sale or other diversion of my controlled medicine. I will allow my provider to discuss my care with, or share a copy of, this agreement with any other treating provider, pharmacy or emergency room where I receive care.     I have read this agreement and have asked questions about anything I did not understand.    ________________________________________________________  Patient Signature - Aurelio Ames     ___________________                   Date     ________________________________________________________  Provider Signature - Lilly Catalan MD       ___________________                    Date     ________________________________________________________  Witness Signature (required if provider not present while patient signing)          ___________________                   Date

## 2023-07-11 NOTE — PROGRESS NOTES
Assessment & Plan     Attention deficit hyperactivity disorder (ADHD), unspecified ADHD type  -Attention is stable on current regimen.  He will continue on his current medications, we will have him increase his clonidine to 0.1 mg in the morning and 0.05 mg at nighttime.  If he does not feel that this is improving he will let me know and we can further adjust to 0.1 mg twice daily.  - cloNIDine (CATAPRES) 0.1 MG tablet  Dispense: 45 tablet; Refill: 5    PTSD (post-traumatic stress disorder)  -Feels like things are under good control with the current medication regimen although still fairly depressed.  We discussed some possible therapist for him to look at closer to home here.  - risperiDONE (RISPERDAL) 0.5 MG tablet  Dispense: 90 tablet; Refill: 5  - risperiDONE (RISPERDAL) 1 MG tablet  Dispense: 60 tablet; Refill: 5    Chronic abdominal pain  -Stable at this time with current medications  - omeprazole (PRILOSEC) 20 MG DR capsule  Dispense: 180 capsule; Refill: 3  - gabapentin (NEURONTIN) 300 MG capsule  Dispense: 450 capsule; Refill: 5    Dermatitis  -Unsure etiology but almost looks like hives possibly related to sun exposure, we will have him use triamcinolone as listed and if not improving he will reach out and let me know.  - triamcinolone (KENALOG) 0.1 % external cream  Dispense: 30 g; Refill: 1           Depression Screening Follow Up        7/11/2023     8:11 AM   PHQ   PHQ-9 Total Score 6   Q9: Thoughts of better off dead/self-harm past 2 weeks Several days   F/U: Thoughts of suicide or self-harm No   F/U: Safety concerns No         7/11/2023     8:11 AM   Last PHQ-9   1.  Little interest or pleasure in doing things 0   2.  Feeling down, depressed, or hopeless 1   3.  Trouble falling or staying asleep, or sleeping too much 0   4.  Feeling tired or having little energy 1   5.  Poor appetite or overeating 0   6.  Feeling bad about yourself 1   7.  Trouble concentrating 1   8.  Moving slowly or restless 1    Q9: Thoughts of better off dead/self-harm past 2 weeks 1   PHQ-9 Total Score 6   In the past two weeks have you had thoughts of suicide or self harm? No   Do you have concerns about your personal safety or the safety of others? No             7/11/2023     9:23 AM   C-SSRS (Brief [place])   Within the last month, have you wished you were dead or wished you could go to sleep and not wake up? No   Within the last month, have you had any actual thoughts of killing yourself? No         Follow Up          Follow Up Actions Taken  Crisis resource information provided in the After Visit Summary  Referred patient back to mental health provider    Discussed the following ways the patient can remain in a safe environment:  be around others      Lilly Catalan MD  [facility]    Megan Parry is a 37 year old, presenting for the following health issues:  Recheck Medication (Feeling like since discontinuing lisinopril and starting clonodine he has been a bit light headed and had headaches )        7/11/2023     9:02 AM   Additional Questions   Roomed by Jackie     History of Present Illness       Hypertension: He presents for follow up of hypertension.  He does not check blood pressure  regularly outside of the clinic. Outpatient blood pressures have not been over 140/90. He does not follow a low salt diet.     He eats 4 or more servings of fruits and vegetables daily.He consumes 5 sweetened beverage(s) daily.He exercises with enough effort to increase his heart rate 10 to 19 minutes per day.  He exercises with enough effort to increase his heart rate 4 days per week.   He is taking medications regularly.    Today's PHQ-9         PHQ-9 Total Score: 6    PHQ-9 Q9 Thoughts of better off dead/self-harm past 2 weeks :   Several days  Thoughts of suicide or self harm: (P) No  Self-harm Plan:     Self-harm Action:       Safety concerns for self or others: (P) No    How difficult have these  "problems made it for you to do your work, take care of things at home, or get along with other people: Somewhat difficult           He was started on clonidine and since then he has noted that he has had some pressure behind his eyes like when he was diagnosed with htn initially. He does wonder going up in the dose of clonidine will help. He does at times feel a little light headed when he stands up too fast. He does drink only one energy drink per day and maybe a cup of coffee.     He is still depressed, sad but is not suicidal. He is seeing therapy every other week on Tuesdays. He is not working at this time, is working on getting back to work. He is wondering about getting a different therapist, his current one is up in [place].       Review of Systems   Per above      Objective    BP (!) 144/100   Pulse 71   Ht 1.803 m (5' 11\")   Wt 72.6 kg (160 lb)   SpO2 98%   BMI 22.32 kg/m    Body mass index is 22.32 kg/m .  Physical Exam   GENERAL: healthy, alert and no distress  NECK: no adenopathy, no asymmetry, masses, or scars and thyroid normal to palpation  RESP: lungs clear to auscultation - no rales, rhonchi or wheezes  CV: regular rate and rhythm, normal S1 S2, no S3 or S4, no murmur, click or rub, no peripheral edema and peripheral pulses strong  MS: no gross musculoskeletal defects noted, no edema  Skin: Hive-like lesions on his upper back that bilaterally                 "

## 2023-07-11 NOTE — LETTER
My Depression Action Plan  Name: Aurelio Ames   Date of Birth 1985  Date: 7/11/2023    My doctor: Lilly Catalan   My clinic: [facility]  [address]  [place] [address]  [place] [address]  [phone]            GREEN    ZONE   Good Control    What it looks like:   Things are going generally well. You have normal ups and downs. You may even feel depressed from time to time, but bad moods usually last less than a day.   What you need to do:  Continue to care for yourself (see self care plan)  Check your depression survival kit and update it as needed  Follow your physician s recommendations including any medication.  Do not stop taking medication unless you consult with your physician first.             YELLOW         ZONE Getting Worse    What it looks like:   Depression is starting to interfere with your life.   It may be hard to get out of bed; you may be starting to isolate yourself from others.  Symptoms of depression are starting to last most all day and this has happened for several days.   You may have suicidal thoughts but they are not constant.   What you need to do:     Call your care team. Your response to treatment will improve if you keep your care team informed of your progress. Yellow periods are signs an adjustment may need to be made.     Continue your self-care.  Just get dressed and ready for the day.  Don't give yourself time to talk yourself out of it.    Talk to someone in your support network.    Open up your Depression Self-Care Plan/Wellness Kit.             RED    ZONE Medical Alert - Get Help    What it looks like:   Depression is seriously interfering with your life.   You may experience these or other symptoms: You can t get out of bed most days, can t work or engage in other necessary activities, you have trouble taking care of basic hygiene, or basic responsibilities, thoughts of suicide or death  that will not go away, self-injurious behavior.     What you need to do:  Call your care team and request a same-day appointment. If they are not available (weekends or after hours) call your local crisis line, emergency room or 911.          Depression Self-Care Plan / Wellness Kit    Many people find that medication and therapy are helpful treatments for managing depression. In addition, making small changes to your everyday life can help to boost your mood and improve your wellbeing. Below are some tips for you to consider. Be sure to talk with your medical provider and/or behavioral health consultant if your symptoms are worsening or not improving.     Sleep   Sleep hygiene  means all of the habits that support good, restful sleep. It includes maintaining a consistent bedtime and wake time, using your bedroom only for sleeping or sex, and keeping the bedroom dark and free of distractions like a computer, smartphone, or television.     Develop a Healthy Routine  Maintain good hygiene. Get out of bed in the morning, make your bed, brush your teeth, take a shower, and get dressed. Don t spend too much time viewing media that makes you feel stressed. Find time to relax each day.    Exercise  Get some form of exercise every day. This will help reduce pain and release endorphins, the  feel good  chemicals in your brain. It can be as simple as just going for a walk or doing some gardening, anything that will get you moving.      Diet  Strive to eat healthy foods, including fruits and vegetables. Drink plenty of water. Avoid excessive sugar, caffeine, alcohol, and other mood-altering substances.     Stay Connected with Others  Stay in touch with friends and family members.    Manage Your Mood  Try deep breathing, massage therapy, biofeedback, or meditation. Take part in fun activities when you can. Try to find something to smile about each day.     Psychotherapy  Be open to working with a therapist if your provider  recommends it.     Medication  Be sure to take your medication as prescribed. Most anti-depressants need to be taken every day. It usually takes several weeks for medications to work. Not all medicines work for all people. It is important to follow-up with your provider to make sure you have a treatment plan that is working for you. Do not stop your medication abruptly without first discussing it with your provider.    Crisis Resources   These hotlines are for both adults and children. They and are open 24 hours a day, 7 days a week unless noted otherwise.    National Suicide Prevention Lifeline   988 or 6-798-515-HNAQ (7731)    Crisis Text Line    www.crisistextline.org  Text HOME to 376004 from anywhere in the [place], anytime, about any type of crisis. A live, trained crisis counselor will receive the text and respond quickly.    [place] Lifeline for LGBTQ Youth  A national crisis intervention and suicide lifeline for LGBTQ youth under 25. Provides a safe place to talk without judgement. Call 1-789.915.3002; text START to 797380 or visit www.thetrevorproject.org to talk to a trained counselor.    For [facility] crisis numbers, visit the [facility] website at:  https://[place].gov/dhs/people-we-serve/adults/health-care/mental-health/resources/crisis-contacts.jsp

## 2023-07-11 NOTE — PATIENT INSTRUCTIONS
For mental Health clinics:     You can look at the psychology today website.     [facility] is another option.     [facility]  [place]  [address]  [phone]     Also in [place]

## 2023-08-01 ENCOUNTER — PATIENT OUTREACH (OUTPATIENT)
Dept: CARE COORDINATION | Facility: CLINIC | Age: 38
End: 2023-08-01
Payer: COMMERCIAL

## 2023-08-02 DIAGNOSIS — F90.9 ATTENTION DEFICIT HYPERACTIVITY DISORDER (ADHD), UNSPECIFIED ADHD TYPE: ICD-10-CM

## 2023-08-02 RX ORDER — DEXTROAMPHETAMINE SACCHARATE, AMPHETAMINE ASPARTATE MONOHYDRATE, DEXTROAMPHETAMINE SULFATE AND AMPHETAMINE SULFATE 7.5; 7.5; 7.5; 7.5 MG/1; MG/1; MG/1; MG/1
30 CAPSULE, EXTENDED RELEASE ORAL 2 TIMES DAILY
Qty: 60 CAPSULE | Refills: 0 | Status: SHIPPED | OUTPATIENT
Start: 2023-08-02 | End: 2023-09-05

## 2023-08-02 NOTE — TELEPHONE ENCOUNTER
Refill Request  Medication name: Pending Prescriptions:                       Disp   Refills    amphetamine-dextroamphetamine (ADDERALL X*60 cap*0            Sig: Take 1 capsule (30 mg) by mouth 2 times daily    Requested Pharmacy:  [facility] #[id number] [facility] [address] AT [facility] [address]

## 2023-08-15 ENCOUNTER — PATIENT OUTREACH (OUTPATIENT)
Dept: CARE COORDINATION | Facility: CLINIC | Age: 38
End: 2023-08-15
Payer: COMMERCIAL

## 2023-09-05 DIAGNOSIS — F90.9 ATTENTION DEFICIT HYPERACTIVITY DISORDER (ADHD), UNSPECIFIED ADHD TYPE: ICD-10-CM

## 2023-09-05 RX ORDER — DEXTROAMPHETAMINE SACCHARATE, AMPHETAMINE ASPARTATE MONOHYDRATE, DEXTROAMPHETAMINE SULFATE AND AMPHETAMINE SULFATE 7.5; 7.5; 7.5; 7.5 MG/1; MG/1; MG/1; MG/1
30 CAPSULE, EXTENDED RELEASE ORAL 2 TIMES DAILY
Qty: 60 CAPSULE | Refills: 0 | Status: SHIPPED | OUTPATIENT
Start: 2023-09-05 | End: 2023-10-02

## 2023-09-05 NOTE — TELEPHONE ENCOUNTER
Refill Request  Medication name: Pending Prescriptions:                       Disp   Refills    amphetamine-dextroamphetamine (ADDERALL X*60 cap*0            Sig: Take 1 capsule (30 mg) by mouth 2 times daily    Requested Pharmacy:  [facility] #[id number] [facility] [address] AT [facility] [address]
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
## 2023-10-02 DIAGNOSIS — F90.9 ATTENTION DEFICIT HYPERACTIVITY DISORDER (ADHD), UNSPECIFIED ADHD TYPE: ICD-10-CM

## 2023-10-02 RX ORDER — DEXTROAMPHETAMINE SACCHARATE, AMPHETAMINE ASPARTATE MONOHYDRATE, DEXTROAMPHETAMINE SULFATE AND AMPHETAMINE SULFATE 7.5; 7.5; 7.5; 7.5 MG/1; MG/1; MG/1; MG/1
30 CAPSULE, EXTENDED RELEASE ORAL 2 TIMES DAILY
Qty: 60 CAPSULE | Refills: 0 | Status: SHIPPED | OUTPATIENT
Start: 2023-10-02 | End: 2024-03-15

## 2023-10-02 NOTE — TELEPHONE ENCOUNTER
Adderall refill       Medication Question or Refill    Contacts         Type Contact Phone/Fax    10/02/2023 08:55 AM CDT Phone (Incoming) Sohan Ames (Self) [phone] (M)            What medication are you calling about (include dose and sig)?: Adderall XR 30mg    Preferred Pharmacy:   [facility] #[id number] - [place] - [address] AT [facility] [address]  [place] [address]  Phone: [phone] Fax: [phone]      Controlled Substance Agreement on file:   CSA -- Patient Level:     [Media Unavailable] Controlled Substance Agreement - Non - Opioid - Scan on 6/15/2022 12:52 PM: NON-OPIOID CONTROLLED SUBSTANCE AGREEMENT   [Media Unavailable] Controlled Substance Agreement - Non - Opioid - Scan on 5/27/2021   [Media Unavailable] Controlled Substance Agreement - Non - Opioid - Scan on 8/18/2020: NON-OPIOID CONTROLLED SUBSTANCE AGREEMENT       Who prescribed the medication?: Dr Catalan    Do you need a refill? Yes    When did you use the medication last? Not saked      Patient offered an appointment? No    Do you have any questions or concerns?  No      Could we send this information to you in [facility] or would you prefer to receive a phone call?:   no call back needed

## 2023-10-14 ENCOUNTER — HEALTH MAINTENANCE LETTER (OUTPATIENT)
Age: 38
End: 2023-10-14

## 2024-03-15 DIAGNOSIS — F90.9 ATTENTION DEFICIT HYPERACTIVITY DISORDER (ADHD), UNSPECIFIED ADHD TYPE: ICD-10-CM

## 2024-03-15 RX ORDER — DEXTROAMPHETAMINE SACCHARATE, AMPHETAMINE ASPARTATE MONOHYDRATE, DEXTROAMPHETAMINE SULFATE AND AMPHETAMINE SULFATE 7.5; 7.5; 7.5; 7.5 MG/1; MG/1; MG/1; MG/1
30 CAPSULE, EXTENDED RELEASE ORAL DAILY
Qty: 30 CAPSULE | Refills: 0 | Status: SHIPPED | OUTPATIENT
Start: 2024-03-15 | End: 2024-04-15

## 2024-03-15 NOTE — TELEPHONE ENCOUNTER
Called and spoke with pt. Agreeable to start at a lower dose however pt wanted to mention that the only reason that he was off of the medication is because he lost his insurance, he now has insurance again. Pt mentioned he has lost his job and is having a lot of trouble with daily things due to lack of focus and would like to get his life back in control and search for a new job asap. Please review and advise.

## 2024-03-15 NOTE — TELEPHONE ENCOUNTER
I sent in the same dose for once a day for now, we'll start there. The two times a day if a bit excessive to go back on right away

## 2024-03-15 NOTE — TELEPHONE ENCOUNTER
Please call patient, he hasn't been on this medication for five months, it feels like a lot to put him back on this high dose.     Is he agreeable to doing a lower dose?

## 2024-03-15 NOTE — TELEPHONE ENCOUNTER
Medication last filled:10/02/2023    Last clinic visit: 07/11/2023     Clinic visit frequency required: Q 6  months    Next clinic visit: 08/02/2024    Controlled substance agreement on file: Yes:  Date 10/02/2023.    Urine Drug Screen on file:  Yes    Pending Prescriptions:                       Disp   Refills    amphetamine-dextroamphetamine (ADDERALL X*60 cap*0            Sig: Take 1 capsule (30 mg) by mouth 2 times daily

## 2024-03-30 ENCOUNTER — HOSPITAL ENCOUNTER (EMERGENCY)
Facility: CLINIC | Age: 39
Discharge: HOME | End: 2024-03-30
Attending: STUDENT IN AN ORGANIZED HEALTH CARE EDUCATION/TRAINING PROGRAM | Admitting: STUDENT IN AN ORGANIZED HEALTH CARE EDUCATION/TRAINING PROGRAM
Payer: COMMERCIAL

## 2024-03-30 ENCOUNTER — APPOINTMENT (OUTPATIENT)
Dept: CT IMAGING | Facility: CLINIC | Age: 39
End: 2024-03-30
Attending: STUDENT IN AN ORGANIZED HEALTH CARE EDUCATION/TRAINING PROGRAM
Payer: COMMERCIAL

## 2024-03-30 VITALS
SYSTOLIC BLOOD PRESSURE: 200 MMHG | HEIGHT: 71 IN | HEART RATE: 74 BPM | DIASTOLIC BLOOD PRESSURE: 123 MMHG | BODY MASS INDEX: 21.7 KG/M2 | TEMPERATURE: 98.6 F | WEIGHT: 155 LBS | OXYGEN SATURATION: 100 % | RESPIRATION RATE: 24 BRPM

## 2024-03-30 DIAGNOSIS — R51.9 ACUTE NONINTRACTABLE HEADACHE, UNSPECIFIED HEADACHE TYPE: ICD-10-CM

## 2024-03-30 PROCEDURE — 99285 EMERGENCY DEPT VISIT HI MDM: CPT | Mod: 25

## 2024-03-30 PROCEDURE — 96361 HYDRATE IV INFUSION ADD-ON: CPT

## 2024-03-30 PROCEDURE — 258N000003 HC RX IP 258 OP 636: Performed by: STUDENT IN AN ORGANIZED HEALTH CARE EDUCATION/TRAINING PROGRAM

## 2024-03-30 PROCEDURE — 96375 TX/PRO/DX INJ NEW DRUG ADDON: CPT

## 2024-03-30 PROCEDURE — 96374 THER/PROPH/DIAG INJ IV PUSH: CPT

## 2024-03-30 PROCEDURE — 250N000011 HC RX IP 250 OP 636: Performed by: STUDENT IN AN ORGANIZED HEALTH CARE EDUCATION/TRAINING PROGRAM

## 2024-03-30 PROCEDURE — 70450 CT HEAD/BRAIN W/O DYE: CPT

## 2024-03-30 RX ORDER — ONDANSETRON 4 MG/1
4 TABLET, ORALLY DISINTEGRATING ORAL EVERY 8 HOURS PRN
Qty: 10 TABLET | Refills: 0 | Status: SHIPPED | OUTPATIENT
Start: 2024-03-30 | End: 2024-04-02

## 2024-03-30 RX ORDER — KETOROLAC TROMETHAMINE 15 MG/ML
15 INJECTION, SOLUTION INTRAMUSCULAR; INTRAVENOUS ONCE
Status: COMPLETED | OUTPATIENT
Start: 2024-03-30 | End: 2024-03-30

## 2024-03-30 RX ORDER — METOCLOPRAMIDE HYDROCHLORIDE 5 MG/ML
10 INJECTION INTRAMUSCULAR; INTRAVENOUS ONCE
Status: COMPLETED | OUTPATIENT
Start: 2024-03-30 | End: 2024-03-30

## 2024-03-30 RX ORDER — DIPHENHYDRAMINE HYDROCHLORIDE 50 MG/ML
25 INJECTION INTRAMUSCULAR; INTRAVENOUS ONCE
Status: COMPLETED | OUTPATIENT
Start: 2024-03-30 | End: 2024-03-30

## 2024-03-30 RX ADMIN — METOCLOPRAMIDE HYDROCHLORIDE 10 MG: 5 INJECTION INTRAMUSCULAR; INTRAVENOUS at 01:28

## 2024-03-30 RX ADMIN — DIPHENHYDRAMINE HYDROCHLORIDE 25 MG: 50 INJECTION INTRAMUSCULAR; INTRAVENOUS at 01:27

## 2024-03-30 RX ADMIN — KETOROLAC TROMETHAMINE 15 MG: 15 INJECTION, SOLUTION INTRAMUSCULAR; INTRAVENOUS at 01:30

## 2024-03-30 RX ADMIN — SODIUM CHLORIDE, POTASSIUM CHLORIDE, SODIUM LACTATE AND CALCIUM CHLORIDE 1000 ML: 600; 310; 30; 20 INJECTION, SOLUTION INTRAVENOUS at 01:24

## 2024-03-30 ASSESSMENT — COLUMBIA-SUICIDE SEVERITY RATING SCALE - C-SSRS
2. HAVE YOU ACTUALLY HAD ANY THOUGHTS OF KILLING YOURSELF IN THE PAST MONTH?: NO
1. IN THE PAST MONTH, HAVE YOU WISHED YOU WERE DEAD OR WISHED YOU COULD GO TO SLEEP AND NOT WAKE UP?: NO
6. HAVE YOU EVER DONE ANYTHING, STARTED TO DO ANYTHING, OR PREPARED TO DO ANYTHING TO END YOUR LIFE?: YES

## 2024-03-30 ASSESSMENT — ACTIVITIES OF DAILY LIVING (ADL)
ADLS_ACUITY_SCORE: 35
ADLS_ACUITY_SCORE: 35
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
## 2024-03-30 NOTE — DISCHARGE INSTRUCTIONS
Please take 500mg of tylenol every 4 hours as well as 600mg of ibuprofen every 6 hours for pain. Do not take more than 4,000mg of tylenol in 24hrs.    Please take the Zofran as needed for nausea and headache    Follow-up with primary care doctor next week if symptoms are not improving.

## 2024-03-30 NOTE — ED TRIAGE NOTES
Pt reports persistent headache over the past 3 days. Today it became worse with sensitivity to light and sound and N/V.     Triage Assessment (Adult)       Row Name 03/30/24 0040          Triage Assessment    Airway WDL WDL        Respiratory WDL    Respiratory WDL WDL        Skin Circulation/Temperature WDL    Skin Circulation/Temperature WDL WDL        Cardiac WDL    Cardiac WDL WDL        Peripheral/Neurovascular WDL    Peripheral Neurovascular WDL WDL        Cognitive/Neuro/Behavioral WDL    Cognitive/Neuro/Behavioral WDL WDL

## 2024-04-02 ENCOUNTER — MYC MEDICAL ADVICE (OUTPATIENT)
Dept: FAMILY MEDICINE | Facility: CLINIC | Age: 39
End: 2024-04-02
Payer: COMMERCIAL

## 2024-04-02 DIAGNOSIS — I10 ESSENTIAL HYPERTENSION: ICD-10-CM

## 2024-04-02 RX ORDER — LISINOPRIL 10 MG/1
10 TABLET ORAL DAILY
Qty: 30 TABLET | Refills: 3 | Status: SHIPPED | OUTPATIENT
Start: 2024-04-02

## 2024-04-03 ENCOUNTER — TELEPHONE (OUTPATIENT)
Dept: FAMILY MEDICINE | Facility: CLINIC | Age: 39
End: 2024-04-03
Payer: COMMERCIAL

## 2024-04-03 NOTE — TELEPHONE ENCOUNTER
Patient came into clinic to let us know pharmacy did not get prescription for lisinopril yet. Please call patient when RX is sent. He would like it to go to [facility] [place].  It is ok to leave detailed message

## 2024-04-05 ENCOUNTER — TELEPHONE (OUTPATIENT)
Dept: FAMILY MEDICINE | Facility: CLINIC | Age: 39
End: 2024-04-05
Payer: COMMERCIAL

## 2024-04-05 NOTE — TELEPHONE ENCOUNTER
Lisinopril RX 4/2/24 not received by pharmacy, [facility] [place].   Phoned in today.

## 2024-04-15 ENCOUNTER — MYC REFILL (OUTPATIENT)
Dept: FAMILY MEDICINE | Facility: CLINIC | Age: 39
End: 2024-04-15
Payer: COMMERCIAL

## 2024-04-15 DIAGNOSIS — F90.9 ATTENTION DEFICIT HYPERACTIVITY DISORDER (ADHD), UNSPECIFIED ADHD TYPE: ICD-10-CM

## 2024-04-15 RX ORDER — DEXTROAMPHETAMINE SACCHARATE, AMPHETAMINE ASPARTATE MONOHYDRATE, DEXTROAMPHETAMINE SULFATE AND AMPHETAMINE SULFATE 7.5; 7.5; 7.5; 7.5 MG/1; MG/1; MG/1; MG/1
30 CAPSULE, EXTENDED RELEASE ORAL DAILY
Qty: 30 CAPSULE | Refills: 0 | Status: SHIPPED | OUTPATIENT
Start: 2024-04-15 | End: 2024-05-14

## 2024-05-14 DIAGNOSIS — F90.9 ATTENTION DEFICIT HYPERACTIVITY DISORDER (ADHD), UNSPECIFIED ADHD TYPE: ICD-10-CM

## 2024-05-14 RX ORDER — DEXTROAMPHETAMINE SACCHARATE, AMPHETAMINE ASPARTATE MONOHYDRATE, DEXTROAMPHETAMINE SULFATE AND AMPHETAMINE SULFATE 7.5; 7.5; 7.5; 7.5 MG/1; MG/1; MG/1; MG/1
30 CAPSULE, EXTENDED RELEASE ORAL DAILY
Qty: 30 CAPSULE | Refills: 0 | Status: SHIPPED | OUTPATIENT
Start: 2024-05-14 | End: 2024-06-19

## 2024-05-14 NOTE — TELEPHONE ENCOUNTER
Medication last filled:04/15/2024    Last clinic visit: 047/11/2023     Clinic visit frequency required: Q 6  months    Next clinic visit: 08/02/2024    Controlled substance agreement on file: Yes:  Date 10/02/2023.    Urine Drug Screen on file:  Yes      Pending Prescriptions:                       Disp   Refills    amphetamine-dextroamphetamine (ADDERALL X*30 cap*0            Sig: Take 1 capsule (30 mg) by mouth daily

## 2024-06-18 DIAGNOSIS — F90.9 ATTENTION DEFICIT HYPERACTIVITY DISORDER (ADHD), UNSPECIFIED ADHD TYPE: ICD-10-CM

## 2024-06-18 NOTE — TELEPHONE ENCOUNTER
..  Medication Question or Refill    Contacts         Type Contact Phone/Fax    06/18/2024 12:58 PM CDT Phone (Incoming) Sohan Ames (Self) [phone] (M)            What medication are you calling about (include dose and sig)?amphetamine-dextroamphetamine (ADDERALL XR) 30 MG 24 hr capsule     Preferred Pharmacy:   [id number] - [place], [place] - [address] [place] [address]  Phone: [phone] Fax: [phone]      Controlled Substance Agreement on file:   CSA -- Patient Level:     [Media Unavailable] Controlled Substance Agreement - Non - Opioid - Scan on 6/15/2022 12:52 PM: NON-OPIOID CONTROLLED SUBSTANCE AGREEMENT   [Media Unavailable] Controlled Substance Agreement - Non - Opioid - Scan on 5/27/2021   [Media Unavailable] Controlled Substance Agreement - Non - Opioid - Scan on 8/18/2020: NON-OPIOID CONTROLLED SUBSTANCE AGREEMENT       Who prescribed the medication?: tyra bryant    Do you need a refill? Yes    When did you use the medication last? today    Patient offered an appointment? No    Do you have any questions or concerns?  No      Could we send this information to you in [facility] or would you prefer to receive a phone call?:   Patient would prefer a phone call   Okay to leave a detailed message?: Yes at Cell number on file:    Telephone Information:   Mobile [phone]

## 2024-06-19 RX ORDER — DEXTROAMPHETAMINE SACCHARATE, AMPHETAMINE ASPARTATE MONOHYDRATE, DEXTROAMPHETAMINE SULFATE AND AMPHETAMINE SULFATE 7.5; 7.5; 7.5; 7.5 MG/1; MG/1; MG/1; MG/1
30 CAPSULE, EXTENDED RELEASE ORAL DAILY
Qty: 30 CAPSULE | Refills: 0 | OUTPATIENT
Start: 2024-06-19

## 2024-06-19 NOTE — TELEPHONE ENCOUNTER
I am unable to prescribe. It states I am missing patient demographics including address. Please look into this and resend when adjusted.

## 2024-06-24 RX ORDER — DEXTROAMPHETAMINE SACCHARATE, AMPHETAMINE ASPARTATE MONOHYDRATE, DEXTROAMPHETAMINE SULFATE AND AMPHETAMINE SULFATE 7.5; 7.5; 7.5; 7.5 MG/1; MG/1; MG/1; MG/1
30 CAPSULE, EXTENDED RELEASE ORAL DAILY
Qty: 30 CAPSULE | Refills: 0 | Status: SHIPPED | OUTPATIENT
Start: 2024-06-24

## 2024-07-02 ENCOUNTER — TELEPHONE (OUTPATIENT)
Dept: FAMILY MEDICINE | Facility: CLINIC | Age: 39
End: 2024-07-02
Payer: COMMERCIAL

## 2024-07-02 NOTE — TELEPHONE ENCOUNTER
General Call    Contacts       Contact Date/Time Type Contact Phone/Fax    07/02/2024 09:59 AM CDT Phone (Outgoing) Sohan Ames (Self) [phone] (M)    No Answer/Busy           Reason for Call:  appt time change due to provider schedule change    What are your questions or concerns:  called pt but no answer or vm.    Due to change on providers schedule, would like to change appt time to 11:10a arrival time on same day

## 2024-12-07 ENCOUNTER — HEALTH MAINTENANCE LETTER (OUTPATIENT)
Age: 39
End: 2024-12-07

## 2025-01-16 ENCOUNTER — TELEPHONE (OUTPATIENT)
Dept: FAMILY MEDICINE | Facility: CLINIC | Age: 40
End: 2025-01-16

## 2025-01-16 NOTE — TELEPHONE ENCOUNTER
Patient Quality Outreach    Patient is due for the following:   Diabetes -  A1C  Hypertension -  Hypertension follow-up visit  Depression  -  PHQ-9 needed  Physical Preventive Adult Physical      Topic Date Due    Hepatitis B Vaccine (1 of 3 - 19+ 3-dose series) Never done    Flu Vaccine (1) 09/01/2024    COVID-19 Vaccine (1 - 2024-25 season) Never done     Chronic Opioid Use -  Urine Drug Screen    Action(s) Taken:   Schedule a Adult Preventative    Type of outreach:    Sent [employer] message.    Questions for provider review:    None           Yamilex Cee LPN